# Patient Record
Sex: FEMALE | Race: WHITE | NOT HISPANIC OR LATINO | Employment: OTHER | URBAN - METROPOLITAN AREA
[De-identification: names, ages, dates, MRNs, and addresses within clinical notes are randomized per-mention and may not be internally consistent; named-entity substitution may affect disease eponyms.]

---

## 2018-01-02 ENCOUNTER — OFFICE VISIT (OUTPATIENT)
Dept: FAMILY MEDICINE CLINIC | Facility: CLINIC | Age: 83
End: 2018-01-02

## 2018-01-02 VITALS
HEIGHT: 67 IN | BODY MASS INDEX: 30.7 KG/M2 | SYSTOLIC BLOOD PRESSURE: 98 MMHG | OXYGEN SATURATION: 99 % | DIASTOLIC BLOOD PRESSURE: 54 MMHG | TEMPERATURE: 98 F | HEART RATE: 83 BPM | WEIGHT: 195.6 LBS

## 2018-01-02 DIAGNOSIS — G31.84 MCI (MILD COGNITIVE IMPAIRMENT): ICD-10-CM

## 2018-01-02 DIAGNOSIS — Z00.00 MEDICARE ANNUAL WELLNESS VISIT, SUBSEQUENT: Primary | ICD-10-CM

## 2018-01-02 PROCEDURE — G0439 PPPS, SUBSEQ VISIT: HCPCS | Performed by: FAMILY MEDICINE

## 2018-01-02 PROCEDURE — 90732 PPSV23 VACC 2 YRS+ SUBQ/IM: CPT | Performed by: FAMILY MEDICINE

## 2018-01-02 PROCEDURE — G0009 ADMIN PNEUMOCOCCAL VACCINE: HCPCS | Performed by: FAMILY MEDICINE

## 2018-01-02 PROCEDURE — 99213 OFFICE O/P EST LOW 20 MIN: CPT | Performed by: FAMILY MEDICINE

## 2018-01-02 NOTE — PROGRESS NOTES
"Subjective   Nenita Tavares is a 86 y.o. female.     Chief Complaint   Patient presents with   • Annual Exam     AWV       Memory issues  History of Present Illness    Patient presents today for memory issues.   is concerned.  She occasionally forget things.  Patient does drive.  Does not get lost driving.  No depression symptoms.  She's not exercising as much she used to.  Especially because the cold weather.  She is considering going to the [employer].  Followed by GI for irritable bowel syndrome.  She takes to nortriptyline tablets at nighttime.  No dry mouth.  Also continues omeprazole 20 mg day.  Symptoms have been going on for some time.      The following portions of the patient's history were reviewed and updated as appropriate: allergies, current medications, past family history, past medical history, past social history, past surgical history and problem list.          Review of Systems   Constitutional: Negative.    Respiratory: Negative.    Cardiovascular: Negative.    Musculoskeletal: Negative.    Neurological: Negative.    Psychiatric/Behavioral: Positive for confusion. Negative for dysphoric mood.       Objective   Blood pressure 98/54, pulse 83, temperature 98 °F (36.7 °C), temperature source Oral, height 170.2 cm (67.01\"), weight 88.7 kg (195 lb 9.6 oz), SpO2 99 %.  Physical Exam   Constitutional: She appears well-developed and well-nourished. No distress.   Neck: No thyromegaly present.   Cardiovascular: Normal rate, regular rhythm, normal heart sounds and intact distal pulses.    Pulmonary/Chest: Effort normal and breath sounds normal.   Musculoskeletal: She exhibits no edema.   Skin: Skin is warm and dry.   Psychiatric: She has a normal mood and affect. Her behavior is normal. Judgment and thought content normal.   Nursing note and vitals reviewed.    Mini-Mental Status exam 2 errors.  Missed \"[address]\".  Clock drawing test equivocal.  and crescent moon shape.  Hands " reversed.    Assessment/Plan   Nenita was seen today for annual exam.    Diagnoses and all orders for this visit:    Medicare annual wellness visit, subsequent    MCI (mild cognitive impairment)    Other orders  -     Pneumococcal Polysaccharide Vaccine 23-Valent Greater Than or Equal To 1yo Subcutaneous / IM      Probable mild cognitive impairment.  At this time no further workup needed.  However I do want to observe.  I'll see her back within 6 months recheck mental status examination.  Family and patient to call with changes.

## 2018-01-02 NOTE — PROGRESS NOTES
QUICK REFERENCE INFORMATION:  The ABCs of the Annual Wellness Visit    Subsequent Medicare Wellness Visit    HEALTH RISK ASSESSMENT    5/3/1931    Recent Hospitalizations:  No hospitalization(s) within the last year..        Current Medical Providers:  Patient Care Team:  Srini Gustafson MD as PCP - General  Srini Gustafson MD as PCP - Family Medicine        Smoking Status:  History   Smoking Status   • Never Smoker   Smokeless Tobacco   • Never Used       Alcohol Consumption:  History   Alcohol Use   • Yes     Comment: rare       Depression Screen:   PHQ-2/PHQ-9 Depression Screening 1/2/2018   Little interest or pleasure in doing things 0   Feeling down, depressed, or hopeless 0   Total Score 0       Health Habits and Functional and Cognitive Screening:  Functional & Cognitive Status 1/2/2018   Do you have difficulty preparing food and eating? No   Do you have difficulty bathing yourself, getting dressed or grooming yourself? No   Do you have difficulty using the toilet? No   Do you have difficulty moving around from place to place? No   Do you have trouble with steps or getting out of a bed or a chair? Yes   In the past year have you fallen or experienced a near fall? No   Current Diet Well Balanced Diet   Dental Exam Up to date   Eye Exam Up to date   Exercise (times per week) 0 times per week   Current Exercise Activities Include None   Do you need help using the phone?  No   Are you deaf or do you have serious difficulty hearing?  No   Do you need help with transportation? No   Do you need help shopping? No   Do you need help preparing meals?  No   Do you need help with housework?  No   Do you need help with laundry? No   Do you need help taking your medications? No   Do you need help managing money? No   Have you felt unusual stress, anger or loneliness in the last month? No   Who do you live with? Spouse   If you need help, do you have trouble finding someone available to you? No   Have you been bothered in  the last four weeks by sexual problems? No   Do you have difficulty concentrating, remembering or making decisions? Yes           Does the patient have evidence of cognitive impairment? Possibly.... See note.    Aspirin use counseling: Does not need ASA (and currently is not on it)      Recent Lab Results:  CMP:  Lab Results   Component Value Date    BUN 15 06/20/2016    CREATININE 0.80 06/20/2016    EGFRIFNONA 68 06/20/2016    BCR 18.8 06/20/2016     06/20/2016    K 4.0 06/20/2016    CO2 23.2 06/20/2016    CALCIUM 9.4 06/20/2016    ALBUMIN 3.90 06/20/2016    LABIL2 1.7 06/20/2016    BILITOT 0.4 06/20/2016    ALKPHOS 63 06/20/2016    AST 19 06/20/2016    ALT 15 06/20/2016     Lipid Panel:     HbA1c:       Visual Acuity:  No exam data present    Age-appropriate Screening Schedule:  Refer to the list below for future screening recommendations based on patient's age, sex and/or medical conditions. Orders for these recommended tests are listed in the plan section. The patient has been provided with a written plan.    Health Maintenance   Topic Date Due   • TDAP/TD VACCINES (1 - Tdap) 05/03/1950   • PNEUMOCOCCAL VACCINES (65+ LOW/MEDIUM RISK) (1 of 2 - PCV13) 05/03/1996   • MAMMOGRAM  06/20/2016   • ZOSTER VACCINE  06/20/2016   • LIPID PANEL  01/02/2018   • INFLUENZA VACCINE  Addressed        Immunization History   Administered Date(s) Administered   • Pneumococcal Conjugate 13-Valent 08/07/2015   • Pneumococcal Polysaccharide 01/02/2018         Subjective   History of Present Illness    Nenita Tavares is a 86 y.o. female who presents for an Subsequent Wellness Visit.    The following portions of the patient's history were reviewed and updated as appropriate: allergies, current medications, past family history, past medical history, past social history, past surgical history and problem list.    Outpatient Medications Prior to Visit   Medication Sig Dispense Refill   • Lactase (LACTAID PO) Take  by mouth as needed.   "   • Loperamide HCl (IMODIUM PO) Take  by mouth as needed.     • nortriptyline (PAMELOR) 10 MG capsule Take  by mouth.     • omeprazole (PRILOSEC) 20 MG capsule Take  by mouth.     • Probiotic Product (PROBIOTIC DAILY PO) Take  by mouth.     • Calcium Carbonate-Vitamin D (CALCIUM-D PO) Take  by mouth.       No facility-administered medications prior to visit.        Patient Active Problem List   Diagnosis   • HLD (hyperlipidemia)   • Irritable bowel syndrome with diarrhea   • Neuralgia neuritis, sciatic nerve   • MCI (mild cognitive impairment)       Advance Care Planning:  has an advance directive - a copy has been provided and is in file    Identification of Risk Factors:  Risk factors include: cognitive impairment.    Review of Systems    Compared to one year ago, the patient feels her physical health is better.  Compared to one year ago, the patient feels her mental health is the same.    Objective     Physical Exam    Vitals:    01/02/18 0916   BP: 98/54   Pulse: 83   Temp: 98 °F (36.7 °C)   TempSrc: Oral   SpO2: 99%   Weight: 88.7 kg (195 lb 9.6 oz)   Height: 170.2 cm (67.01\")       Body mass index is 30.63 kg/(m^2).  Discussed the patient's BMI with her. BMI is above normal parameters. Follow-up plan includes:  exercise counseling.    Assessment/Plan   Patient Self-Management and Personalized Health Advice  The patient has been provided with information about: exercise and mental health concerns and preventive services including:   · Pneumococcal vaccine .    Visit Diagnoses:    ICD-10-CM ICD-9-CM   1. Medicare annual wellness visit, subsequent Z00.00 V70.0   2. MCI (mild cognitive impairment) G31.84 331.83       Orders Placed This Encounter   Procedures   • Pneumococcal Polysaccharide Vaccine 23-Valent Greater Than or Equal To 1yo Subcutaneous / IM       Outpatient Encounter Prescriptions as of 1/2/2018   Medication Sig Dispense Refill   • Lactase (LACTAID PO) Take  by mouth as needed.     • Loperamide HCl " (IMODIUM PO) Take  by mouth as needed.     • nortriptyline (PAMELOR) 10 MG capsule Take  by mouth.     • omeprazole (PRILOSEC) 20 MG capsule Take  by mouth.     • Probiotic Product (PROBIOTIC DAILY PO) Take  by mouth.     • [DISCONTINUED] Calcium Carbonate-Vitamin D (CALCIUM-D PO) Take  by mouth.       No facility-administered encounter medications on file as of 1/2/2018.        Reviewed use of high risk medication in the elderly: yes  Reviewed for potential of harmful drug interactions in the elderly: yes    Follow Up:  Return in about 6 months (around 7/2/2018).     An After Visit Summary and PPPS with all of these plans were given to the patient.

## 2018-07-03 ENCOUNTER — OFFICE VISIT (OUTPATIENT)
Dept: FAMILY MEDICINE CLINIC | Facility: CLINIC | Age: 83
End: 2018-07-03

## 2018-07-03 VITALS
OXYGEN SATURATION: 98 % | HEIGHT: 67 IN | WEIGHT: 189 LBS | BODY MASS INDEX: 29.66 KG/M2 | HEART RATE: 79 BPM | SYSTOLIC BLOOD PRESSURE: 118 MMHG | TEMPERATURE: 98.5 F | DIASTOLIC BLOOD PRESSURE: 68 MMHG

## 2018-07-03 DIAGNOSIS — G31.84 MCI (MILD COGNITIVE IMPAIRMENT): Primary | ICD-10-CM

## 2018-07-03 PROCEDURE — 99213 OFFICE O/P EST LOW 20 MIN: CPT | Performed by: FAMILY MEDICINE

## 2018-07-03 RX ORDER — MONTELUKAST SODIUM 4 MG/1
1 TABLET, CHEWABLE ORAL 2 TIMES DAILY
COMMUNITY
Start: 2018-06-05 | End: 2020-12-09

## 2018-07-03 NOTE — PROGRESS NOTES
"Subjective   Nenita Tavares is a 87 y.o. female.     Chief Complaint   Patient presents with   • Hyperlipidemia        History of Present Illness    Follow-up mild cognitive impairment.  About 6 months ago was seen.  2 errors on Mini-Mental Status exam.  Equivocal clock drawing test with hands reversed and crescent-shaped numbers.    In the last 6 months neither patient nor her  feel as if there been any steep changes.  Overall stable functioning.  She is driving now.  Not getting lost.  She enjoys doing her puzzles and crosswords.  She goes to the [employer] every so often with a pledgeted amount of money.  There is no behavioral concerns.  No depression.  She is going to an exercise program a few times a week which she enjoys.  Her father did suffer from dementia.  Her IBS symptoms are much improved with Colestid.        The following portions of the patient's history were reviewed and updated as appropriate: allergies, current medications, past family history, past medical history, past social history, past surgical history and problem list.          Review of Systems   Constitutional: Negative.    Respiratory: Negative.    Cardiovascular: Negative.    Musculoskeletal: Negative.    Neurological: Negative.    Psychiatric/Behavioral: Negative.  Negative for confusion and dysphoric mood. The patient is not nervous/anxious.        Objective   Blood pressure 118/68, pulse 79, temperature 98.5 °F (36.9 °C), temperature source Oral, height 170.2 cm (67.01\"), weight 85.7 kg (189 lb), SpO2 98 %.  Physical Exam   Constitutional: She appears well-developed and well-nourished. No distress.   Neck: No thyromegaly present.   Cardiovascular: Normal rate, regular rhythm, normal heart sounds and intact distal pulses.    Pulmonary/Chest: Effort normal and breath sounds normal.   Musculoskeletal: She exhibits no edema.   Skin: Skin is warm and dry.   Psychiatric: She has a normal mood and affect. Her behavior is normal. Judgment " and thought content normal.   Nursing note and vitals reviewed.    Blessed Mini-Mental status examination, 0 errors.  The clock drawing test is improved compared to last time.  The numbers are perfectly normal and spaced.  However she did do the hands wrong.  She did 10 min to 11 versus 10 minutes after 11.    .  Assessment/Plan   Nenita was seen today for hyperlipidemia.    Diagnoses and all orders for this visit:    MCI (mild cognitive impairment)       mild cognitive impairment versus age-related cognitive decline.  There is no current evidence of dementia.  No absolute indication for MRI imaging.  I recommend observation.  Continue exercising.  If there are major changes in her cognitive functioning, or other concerns develop such as getting lost driving, recommend further evaluation.  Otherwise I'll see her in 6 months for recheck and annual wellness visit

## 2018-11-12 ENCOUNTER — OFFICE VISIT (OUTPATIENT)
Dept: FAMILY MEDICINE CLINIC | Facility: CLINIC | Age: 83
End: 2018-11-12

## 2018-11-12 VITALS
DIASTOLIC BLOOD PRESSURE: 75 MMHG | HEIGHT: 67 IN | HEART RATE: 78 BPM | OXYGEN SATURATION: 100 % | BODY MASS INDEX: 29.73 KG/M2 | WEIGHT: 189.4 LBS | SYSTOLIC BLOOD PRESSURE: 144 MMHG | TEMPERATURE: 97 F

## 2018-11-12 DIAGNOSIS — S43.421A SPRAIN OF RIGHT ROTATOR CUFF CAPSULE, INITIAL ENCOUNTER: Primary | ICD-10-CM

## 2018-11-12 PROCEDURE — 99213 OFFICE O/P EST LOW 20 MIN: CPT | Performed by: FAMILY MEDICINE

## 2018-11-12 NOTE — PROGRESS NOTES
"Subjective   Nenita Tavares is a 87 y.o. female.     Chief Complaint   Patient presents with   • Arm Pain     right arm upper arm pain,going into the shoulder, x sept, she goes to a fitness center and lifts some weights        History of Present Illness    Right shoulder pain for a couple of months.  She thinks she might have injured herself lifting weights at the fitness center.  Mild to moderate pain getting worse.  Bothers her at night.  No weakness.  She has ongoing right ulnar nerve numbness from a ulnar nerve release about 5 years ago.  But not worse.  No injury otherwise.  Worse with certain movements, especially internal rotation.      The following portions of the patient's history were reviewed and updated as appropriate: allergies, current medications, past family history, past medical history, past social history, past surgical history and problem list.          Review of Systems   Constitutional: Negative.    Musculoskeletal: Negative for joint swelling.   Neurological: Negative for weakness.       Objective   Blood pressure 144/75, pulse 78, temperature 97 °F (36.1 °C), temperature source Oral, height 170.2 cm (67.01\"), weight 85.9 kg (189 lb 6.4 oz), SpO2 100 %.  Physical Exam   Constitutional: No distress.   HENT:   Head: Atraumatic.   Cardiovascular: Normal rate.   Pulmonary/Chest: Effort normal.   Musculoskeletal:   Right shoulder full range of motion exception of internal rotation.  Strength 5 out of 5.  She has pain to palpation over the deltoid.  Positive impingement testing.  No acromioclavicular discomfort.   Skin: She is not diaphoretic.       Assessment/Plan   Nenita was seen today for arm pain.    Diagnoses and all orders for this visit:    Sprain of right rotator cuff capsule, initial encounter  -     Ambulatory Referral to Sports Medicine  -     Ambulatory Referral to Physical Therapy Evaluate and treat        Rotator cuff tendinitis or tendinopathy.  I'm referring to physical therapy.  She " has mild to moderate symptoms.  She also may be a good candidate for steroid injection, referring to sports medicine.  I'll follow-up with her as needed.

## 2018-11-15 ENCOUNTER — OFFICE VISIT (OUTPATIENT)
Dept: SPORTS MEDICINE | Facility: CLINIC | Age: 83
End: 2018-11-15

## 2018-11-15 VITALS
OXYGEN SATURATION: 99 % | WEIGHT: 189 LBS | SYSTOLIC BLOOD PRESSURE: 122 MMHG | HEIGHT: 67 IN | DIASTOLIC BLOOD PRESSURE: 68 MMHG | BODY MASS INDEX: 29.66 KG/M2 | HEART RATE: 90 BPM

## 2018-11-15 DIAGNOSIS — M25.511 CHRONIC RIGHT SHOULDER PAIN: Primary | ICD-10-CM

## 2018-11-15 DIAGNOSIS — G89.29 CHRONIC RIGHT SHOULDER PAIN: Primary | ICD-10-CM

## 2018-11-15 DIAGNOSIS — M75.41 SUBACROMIAL IMPINGEMENT OF RIGHT SHOULDER: ICD-10-CM

## 2018-11-15 PROCEDURE — 73030 X-RAY EXAM OF SHOULDER: CPT | Performed by: FAMILY MEDICINE

## 2018-11-15 PROCEDURE — 99214 OFFICE O/P EST MOD 30 MIN: CPT | Performed by: FAMILY MEDICINE

## 2018-11-15 PROCEDURE — 20610 DRAIN/INJ JOINT/BURSA W/O US: CPT | Performed by: FAMILY MEDICINE

## 2018-11-15 RX ORDER — TRIAMCINOLONE ACETONIDE 40 MG/ML
80 INJECTION, SUSPENSION INTRA-ARTICULAR; INTRAMUSCULAR ONCE
Status: COMPLETED | OUTPATIENT
Start: 2018-11-15 | End: 2018-11-15

## 2018-11-15 RX ADMIN — TRIAMCINOLONE ACETONIDE 80 MG: 40 INJECTION, SUSPENSION INTRA-ARTICULAR; INTRAMUSCULAR at 11:24

## 2018-11-15 NOTE — PROGRESS NOTES
"Nenita is a 87 y.o. year old female    Chief Complaint   Patient presents with   • Right Shoulder - Consult, Pain     Lifting small weights for about a years, just started hurting in September. Referred Dr. Gustafson.        History of Present Illness  HPI   Right shoulder pain that began several months ago with no known injury.  She does do senior weight class and exercises regularly.  She has been doing this for the past  year.  Has had pain with overhead motion and also nighttime awakenings.  Has tried Aleve.  Here for further evaluation.  Requests injection.    I have reviewed the patient's medical, family, and social history in detail and updated the computerized patient record.    Review of Systems   Constitutional: Negative for fever.   Musculoskeletal:        Per HPI   Skin: Negative for rash.   Neurological: Negative for weakness and numbness.   Psychiatric/Behavioral: Negative for sleep disturbance.   All other systems reviewed and are negative.      /68   Pulse 90   Ht 170.2 cm (67\")   Wt 85.7 kg (189 lb)   SpO2 99%   BMI 29.60 kg/m²      Physical Exam    Vital signs reviewed.   General: No acute distress.  Eyes: conjunctiva clear; pupils equally round and reactive  ENT: external ears and nose atraumatic; oropharynx clear  CV: no peripheral edema, 2+ distal pulses  Resp: normal respiratory effort, no use of accessory muscles  Skin: no rashes or wounds; normal turgor  Psych: mood and affect appropriate; recent and remote memory intact  Neuro: sensation to light touch intact    MSK Exam:  Right Shoulder Exam     Tenderness   Right shoulder tenderness location: subacromial space.    Range of Motion   The patient has normal right shoulder ROM.  Right shoulder active abduction: painful.   Right shoulder internal rotation 0 degrees: painful.     Muscle Strength   The patient has normal right shoulder strength.    Tests   Huff test: positive  Impingement: positive  Drop arm: negative    Other   Erythema: " "absent  Sensation: normal          Right Shoulder X-Ray  Indication: Pain  Views: Axillary Internal and External Rotation    Findings:  No fracture  No bony lesion  Normal soft tissues  Normal joint spaces    No prior studies were available for comparison.    Shoulder Injection Procedure Note    Right shoulder subacromial bursa injection was discussed with the patient in detail, including indication, risks, benefits, and alternatives. Verbal consent was given for the procedure. Injection was performed by physician.  Injection site was identified by physical examination and cleaned with Betadine and alcohol swabs. Prior to needle insertion, ethyl chloride spray was used for surface anesthesia. Sterile technique was used.  A 22-gauge, 1.5\" needle was directed to the joint from a(n) posterior approach. Injectate was passed into the subacromial bursa without difficulty. The needle was removed and a simple bandage was applied. The procedure was tolerated well without difficulty.    Injection mixture:  1% lidocaine without epinephrine: 4 mL  40 mg/mL triamcinolone acetonide: 2 mL    Diagnoses and all orders for this visit:    Chronic right shoulder pain  -     XR Shoulder 2+ View Right  -     triamcinolone acetonide (KENALOG-40) injection 80 mg; Inject 2 mL into the appropriate joint as directed by provider 1 (One) Time.    Subacromial impingement of right shoulder  -     triamcinolone acetonide (KENALOG-40) injection 80 mg; Inject 2 mL into the appropriate joint as directed by provider 1 (One) Time.      Postprocedural instructions given.  Home exercise program given.    EMR Dragon/Transcription disclaimer:    Much of this encounter note is an electronic transcription/translation of spoken language to printed text.  The electronic translation of spoken language may permit erroneous, or at times, nonsensical words or phrases to be inadvertently transcribed.  Although I have reviewed the note for such errors some may still " exist.

## 2019-01-18 ENCOUNTER — OFFICE VISIT (OUTPATIENT)
Dept: FAMILY MEDICINE CLINIC | Facility: CLINIC | Age: 84
End: 2019-01-18

## 2019-01-18 VITALS
HEART RATE: 90 BPM | WEIGHT: 185.3 LBS | SYSTOLIC BLOOD PRESSURE: 112 MMHG | OXYGEN SATURATION: 99 % | HEIGHT: 67 IN | DIASTOLIC BLOOD PRESSURE: 74 MMHG | BODY MASS INDEX: 29.08 KG/M2 | TEMPERATURE: 97.9 F

## 2019-01-18 DIAGNOSIS — Z00.00 MEDICARE ANNUAL WELLNESS VISIT, SUBSEQUENT: Primary | ICD-10-CM

## 2019-01-18 DIAGNOSIS — K58.0 IRRITABLE BOWEL SYNDROME WITH DIARRHEA: ICD-10-CM

## 2019-01-18 DIAGNOSIS — G31.84 MCI (MILD COGNITIVE IMPAIRMENT): ICD-10-CM

## 2019-01-18 DIAGNOSIS — I95.2 HYPOTENSION DUE TO DRUGS: ICD-10-CM

## 2019-01-18 PROCEDURE — G0439 PPPS, SUBSEQ VISIT: HCPCS | Performed by: FAMILY MEDICINE

## 2019-01-18 PROCEDURE — 93000 ELECTROCARDIOGRAM COMPLETE: CPT | Performed by: FAMILY MEDICINE

## 2019-01-18 PROCEDURE — 99214 OFFICE O/P EST MOD 30 MIN: CPT | Performed by: FAMILY MEDICINE

## 2019-01-18 NOTE — PROGRESS NOTES
QUICK REFERENCE INFORMATION:  The ABCs of the Annual Wellness Visit    Subsequent Medicare Wellness Visit    HEALTH RISK ASSESSMENT    5/3/1931    Recent Hospitalizations:  No hospitalization(s) within the last year..        Current Medical Providers:  Patient Care Team:  Srini Gustafson MD as PCP - General  Srini Gustafson MD as PCP - Family Medicine        Smoking Status:  Social History     Tobacco Use   Smoking Status Never Smoker   Smokeless Tobacco Never Used       Alcohol Consumption:  Social History     Substance and Sexual Activity   Alcohol Use Yes    Comment: social drink       Depression Screen:   PHQ-2/PHQ-9 Depression Screening 1/2/2018   Little interest or pleasure in doing things 0   Feeling down, depressed, or hopeless 0   Total Score 0       Health Habits and Functional and Cognitive Screening:  Functional & Cognitive Status 1/18/2019   Do you have difficulty preparing food and eating? No   Do you have difficulty bathing yourself, getting dressed or grooming yourself? No   Do you have difficulty using the toilet? No   Do you have difficulty moving around from place to place? No   Do you have trouble with steps or getting out of a bed or a chair? No   In the past year have you fallen or experienced a near fall? No   Current Diet Unhealthy Diet   Dental Exam Up to date   Eye Exam Up to date   Exercise (times per week) 2 times per week   Current Exercise Activities Include Cardiovasular Workout on Exercise Equipment   Do you need help using the phone?  No   Are you deaf or do you have serious difficulty hearing?  No   Do you need help with transportation? No   Do you need help shopping? No   Do you need help preparing meals?  No   Do you need help with housework?  No   Do you need help with laundry? No   Do you need help taking your medications? No   Do you need help managing money? No   Do you ever drive or ride in a car without wearing a seat belt? No   Have you felt unusual stress, anger or  loneliness in the last month? No   Who do you live with? Spouse   If you need help, do you have trouble finding someone available to you? No   Have you been bothered in the last four weeks by sexual problems? No   Do you have difficulty concentrating, remembering or making decisions? No           Does the patient have evidence of cognitive impairment? Yes    Aspirin use counseling: Does not need ASA (and currently is not on it)      Recent Lab Results:  CMP:  Lab Results   Component Value Date    BUN 15 06/20/2016    CREATININE 0.80 06/20/2016    EGFRIFNONA 68 06/20/2016    BCR 18.8 06/20/2016     06/20/2016    K 4.0 06/20/2016    CO2 23.2 06/20/2016    CALCIUM 9.4 06/20/2016    ALBUMIN 3.90 06/20/2016    BILITOT 0.4 06/20/2016    ALKPHOS 63 06/20/2016    AST 19 06/20/2016    ALT 15 06/20/2016     Lipid Panel:     HbA1c:       Visual Acuity:  No exam data present    Age-appropriate Screening Schedule:  Refer to the list below for future screening recommendations based on patient's age, sex and/or medical conditions. Orders for these recommended tests are listed in the plan section. The patient has been provided with a written plan.    Health Maintenance   Topic Date Due   • TDAP/TD VACCINES (1 - Tdap) 05/03/1950   • ZOSTER VACCINE (1 of 2) 05/03/1981   • MAMMOGRAM  06/20/2016   • LIPID PANEL  01/02/2018   • INFLUENZA VACCINE  08/01/2018   • PNEUMOCOCCAL VACCINES (65+ LOW/MEDIUM RISK)  Completed        Subjective   History of Present Illness    Nenita Tavares is a 87 y.o. female who presents for an Subsequent Wellness Visit.    The following portions of the patient's history were reviewed and updated as appropriate: allergies, current medications, past family history, past medical history, past social history, past surgical history and problem list.    Outpatient Medications Prior to Visit   Medication Sig Dispense Refill   • colestipol (COLESTID) 1 g tablet Take 1 tablet by mouth 2 (Two) Times a Day.     •  "Lactase (LACTAID PO) Take  by mouth as needed.     • Loperamide HCl (IMODIUM PO) Take  by mouth as needed.     • nortriptyline (PAMELOR) 10 MG capsule Take  by mouth.     • omeprazole (PRILOSEC) 20 MG capsule Take  by mouth.     • Probiotic Product (PROBIOTIC DAILY PO) Take  by mouth.       No facility-administered medications prior to visit.        Patient Active Problem List   Diagnosis   • HLD (hyperlipidemia)   • Irritable bowel syndrome with diarrhea   • MCI (mild cognitive impairment)       Advance Care Planning:  Has living will on file.     Identification of Risk Factors:  Risk factors include: cardiovascular risk and cognitive impairment.    Review of Systems    Compared to one year ago, the patient feels her physical health is the same.  Compared to one year ago, the patient feels her mental health is the same.    Objective     Physical Exam    Vitals:    01/18/19 1304 01/18/19 1312   BP: 141/85 112/74   Pulse: 90    Temp: 97.9 °F (36.6 °C)    TempSrc: Oral    SpO2: 99%    Weight: 84.1 kg (185 lb 4.8 oz)    Height: 170.2 cm (67.01\")        Patient's Body mass index is 29.02 kg/m². BMI is within normal parameters. No follow-up required..      Assessment/Plan   Patient Self-Management and Personalized Health Advice  The patient has been provided with information about: mental health concerns and preventive services including:   · recommend new shingles vaccine at retail pharmacy.    Visit Diagnoses:    ICD-10-CM ICD-9-CM   1. Medicare annual wellness visit, subsequent Z00.00 V70.0   2. MCI (mild cognitive impairment) G31.84 331.83   3. Irritable bowel syndrome with diarrhea K58.0 564.1       No orders of the defined types were placed in this encounter.      Outpatient Encounter Medications as of 1/18/2019   Medication Sig Dispense Refill   • colestipol (COLESTID) 1 g tablet Take 1 tablet by mouth 2 (Two) Times a Day.     • Lactase (LACTAID PO) Take  by mouth as needed.     • Loperamide HCl (IMODIUM PO) Take  " by mouth as needed.     • nortriptyline (PAMELOR) 10 MG capsule Take  by mouth.     • omeprazole (PRILOSEC) 20 MG capsule Take  by mouth.     • Probiotic Product (PROBIOTIC DAILY PO) Take  by mouth.       No facility-administered encounter medications on file as of 1/18/2019.        Reviewed use of high risk medication in the elderly: yes  Reviewed for potential of harmful drug interactions in the elderly: yes    Follow Up:  No Follow-up on file.     An After Visit Summary and PPPS with all of these plans were given to the patient.

## 2019-01-18 NOTE — PROGRESS NOTES
Subjective   Nenita Tavares is a 87 y.o. female.     Chief Complaint   Patient presents with   • Medicare Wellness-subsequent   • Dizziness     x 1 wk         History of Present Illness     Dizziness on and off for 3 days.  Patient describes as vertigo.  But no spinning.  He she feels unsteady.  It's worse when she stands up.  She is on on blood pressure medication.  She does not check her blood pressure at home.  She states she has little twinges of left-sided breast and chest discomfort going into her left arm.  Occurs very rarely.  Sometimes during the day.  Times at nighttime.  Not with activity.  She describes the dizziness getting worse and progressive.  She has no changes in neurological function such as new weakness, numbness, slurred speech, or other changes.  She's had a slight headache in the back of her head.  She describes no significant stressors.  No depression.  She states she's had vertigo symptoms in the distant past.  No recent cold symptoms.  No fever.  No sinus congestion or ear pain.  She describes the dizziness as being severe at times.    Memory issues.  Possible mild cognitive impairment.  No change in memory.  Mood.  Or executive function.    IBS.  Table.  On nortriptyline prescribed by GI.        The following portions of the patient's history were reviewed and updated as appropriate: allergies, current medications, past family history, past medical history, past social history, past surgical history and problem list.          Review of Systems   Constitutional: Negative.  Negative for fatigue and fever.   Respiratory: Negative.    Cardiovascular: Positive for chest pain. Negative for palpitations and leg swelling.   Gastrointestinal: Negative.    Genitourinary: Negative.    Musculoskeletal: Negative.    Neurological: Positive for dizziness and headaches. Negative for tremors, seizures, syncope, speech difficulty, weakness and light-headedness.   Psychiatric/Behavioral: Negative.   "      Objective   Blood pressure 112/74, pulse 90, temperature 97.9 °F (36.6 °C), temperature source Oral, height 170.2 cm (67.01\"), weight 84.1 kg (185 lb 4.8 oz), SpO2 99 %.  Physical Exam   Constitutional: No distress.   No acute distress.  Nontoxic.   HENT:   Right Ear: Tympanic membrane, external ear and ear canal normal.   Left Ear: Tympanic membrane, external ear and ear canal normal.   Nose: Nose normal.   Mouth/Throat: Oropharynx is clear and moist. No oropharyngeal exudate.   Eyes: Conjunctivae are normal. Right eye exhibits no discharge. Left eye exhibits no discharge. No scleral icterus.   Cardiovascular: Normal rate.   Pulmonary/Chest: Effort normal and breath sounds normal. No stridor. No respiratory distress. She has no wheezes. She has no rales.   No tachypnea   Lymphadenopathy:     She has no cervical adenopathy.   Neurological:   Neurological exam.  Pupils equal and reactive to light.  Extra ocular muscle movements intact all directions.  Face symmetric.  Negative [place]-Hallpike.  No nystagmus.  Gait is unremarkable.  No ataxia.  No dysmetria.   Skin: Skin is warm and dry. No rash noted.   Psychiatric: She has a normal mood and affect. Her behavior is normal.   Nursing note and vitals reviewed.    Orthostatic blood pressure readings.  Lying down 147/77, sitting 143/78, standing 118/68.  Repeat 121/68.  No change in heart rate    EKG.  Indication lightheadedness.  Sinus rhythm.  Normal ventricular rate.  Normal KS interval.  Normal QRS.  Normal QTC.  Normal axis.  There are no ST segment abnormalities.  There are some U waves noted.  Overall normal EKG.    On the exam table she'll hold her head as if she is dizzy.  But when distracted and talking, she feels fine.  At one point she was able to bend over and show me a bruise on her leg without any symptoms at all.    Assessment/Plan   Nenita was seen today for medicare wellness-subsequent and dizziness.    Diagnoses and all orders for this " visit:    Medicare annual wellness visit, subsequent    MCI (mild cognitive impairment)    Irritable bowel syndrome with diarrhea    Hypotension due to drugs      Dizziness.  Probable mild orthostatic hypotension.  The main culprit is the nortriptyline.  I recommended she stop it.  She states she does not want to because she states she gets bad diarrhea without it.  She states she has had a lot of dry mouth lately.  I recommend plenty of fluids.  She also take Pedialyte.  At this point I don't see a central cause of her dizziness.  Likely not central vertigo.  Likely not a impending CVA or other significant event.  Is also no evidence of peripheral vertigo.  That said the patient and  understand to go directly to the emergency room with worsening symptoms this weekend.  I want to see her back 1 week for recheck.    Memory changes.  Possible mild cognitive impairment.  Unchanged.    IBS with diarrhea.  See above.

## 2019-01-25 ENCOUNTER — OFFICE VISIT (OUTPATIENT)
Dept: FAMILY MEDICINE CLINIC | Facility: CLINIC | Age: 84
End: 2019-01-25

## 2019-01-25 VITALS
OXYGEN SATURATION: 98 % | SYSTOLIC BLOOD PRESSURE: 153 MMHG | DIASTOLIC BLOOD PRESSURE: 80 MMHG | TEMPERATURE: 97.6 F | HEART RATE: 90 BPM | BODY MASS INDEX: 29.03 KG/M2 | WEIGHT: 185 LBS | HEIGHT: 67 IN

## 2019-01-25 DIAGNOSIS — R42 DIZZINESS: Primary | ICD-10-CM

## 2019-01-25 PROCEDURE — 99213 OFFICE O/P EST LOW 20 MIN: CPT | Performed by: FAMILY MEDICINE

## 2019-01-25 NOTE — PROGRESS NOTES
"Subjective   Nenita Tavares is a 87 y.o. female.     Chief Complaint   Patient presents with   • Dizziness     pt said she is feeling better        History of Present Illness    Follow-up dizziness and probable orthostatic hypotension.  I was concerned about other etiologies.  She continues the nortriptyline because of her diarrhea predominant IBS.  The diarrhea comes and goes.  She's back into a little bit but she's well-hydrated.  Drinking plenty of water.  She no longer has the dizziness or lightheadedness.  There are no cardiovascular or neurovascular symptoms.      The following portions of the patient's history were reviewed and updated as appropriate: allergies, current medications, past family history, past medical history, past social history, past surgical history and problem list.          Review of Systems   Constitutional: Negative.    Respiratory: Negative.    Cardiovascular: Negative.    Musculoskeletal: Negative.    Neurological: Negative for dizziness, light-headedness and headaches.       Objective   Blood pressure 153/80, pulse 90, temperature 97.6 °F (36.4 °C), temperature source Oral, height 170.2 cm (67.01\"), weight 83.9 kg (185 lb), SpO2 98 %.  Physical Exam   Constitutional: She appears well-developed and well-nourished. No distress.   Eyes: Conjunctivae and EOM are normal. Pupils are equal, round, and reactive to light.   Neck: No thyromegaly present.   Cardiovascular: Normal rate, regular rhythm, normal heart sounds and intact distal pulses.   Pulmonary/Chest: Effort normal and breath sounds normal.   Musculoskeletal: She exhibits no edema.   Neurological: She is alert. She exhibits normal muscle tone. Coordination normal.   Skin: Skin is warm and dry.   Psychiatric: She has a normal mood and affect. Her behavior is normal. Judgment and thought content normal.   Nursing note and vitals reviewed.      Assessment/Plan   Nenita was seen today for dizziness.    Diagnoses and all orders for this " visit:    Dizziness     dizziness.  Resolved.  Probable resolved orthostatic hypotension.  Blood pressure is now higher.  She overall looks better.  She likely had some dehydration, probably from the IBS with diarrhea.  At this time no further workup needed.  I had asked her to come back today in case we need to order an MRI or other evaluation was in order.  With recurrent symptoms they will let us know.

## 2019-04-01 ENCOUNTER — OFFICE VISIT (OUTPATIENT)
Dept: FAMILY MEDICINE CLINIC | Facility: CLINIC | Age: 84
End: 2019-04-01

## 2019-04-01 ENCOUNTER — HOSPITAL ENCOUNTER (OUTPATIENT)
Dept: GENERAL RADIOLOGY | Facility: HOSPITAL | Age: 84
Discharge: HOME | End: 2019-04-01
Admitting: NURSE PRACTITIONER

## 2019-04-01 VITALS
HEART RATE: 86 BPM | BODY MASS INDEX: 29.9 KG/M2 | DIASTOLIC BLOOD PRESSURE: 68 MMHG | HEIGHT: 67 IN | SYSTOLIC BLOOD PRESSURE: 130 MMHG | OXYGEN SATURATION: 97 % | WEIGHT: 190.5 LBS | RESPIRATION RATE: 12 BRPM

## 2019-04-01 DIAGNOSIS — M54.2 NECK PAIN: ICD-10-CM

## 2019-04-01 DIAGNOSIS — M54.2 NECK PAIN: Primary | ICD-10-CM

## 2019-04-01 PROCEDURE — 72040 X-RAY EXAM NECK SPINE 2-3 VW: CPT

## 2019-04-01 PROCEDURE — 99213 OFFICE O/P EST LOW 20 MIN: CPT | Performed by: NURSE PRACTITIONER

## 2019-04-01 RX ORDER — BACLOFEN 10 MG/1
10 TABLET ORAL 3 TIMES DAILY PRN
Qty: 30 TABLET | Refills: 0 | Status: SHIPPED | OUTPATIENT
Start: 2019-04-01 | End: 2019-10-07

## 2019-04-01 NOTE — PROGRESS NOTES
Subjective   Nenita Tavares is a 87 y.o. female presents with neck pain, bilateral, worse with turning her head from side to side and posterior neck pain with pain into back of head with certain positions. States she was evaluated previously with Dr. Gustafson in January and her family was concerned it was a stroke. Upon reviewing notes further, patient denies same symptoms. Currently without dizziness or chest pain, no hypotensive episodes. States she is ok, no pain in the morning when waking, however the pain significantly worsens as the day goes on. She has tried tylenol with some relief. States aleve has not helped her symptoms.  She did develop numbness on her four fingertips that lasted seconds and then resolved. Denies any  changes, no problems picking up items. No facial drooping or speech changes.       Neck Pain    This is a new problem. The current episode started 1 to 4 weeks ago. The problem occurs daily. The problem has been unchanged. The pain is associated with an unknown factor. The pain is present in the midline. The quality of the pain is described as aching. The pain is at a severity of 6/10. The pain is moderate. The symptoms are aggravated by position and twisting. The pain is worse during the day. Associated symptoms include numbness (once in left fingers). Pertinent negatives include no chest pain, fever, headaches, leg pain, pain with swallowing, paresis, photophobia, syncope, tingling, trouble swallowing, visual change, weakness or weight loss. She has tried NSAIDs and acetaminophen for the symptoms. The treatment provided mild relief.        The following portions of the patient's history were reviewed and updated as appropriate: allergies, current medications, past family history, past medical history, past social history, past surgical history and problem list.    Review of Systems   Constitutional: Negative for fever and weight loss.   HENT: Negative for trouble swallowing.    Eyes:  Negative for photophobia.   Cardiovascular: Negative for chest pain and syncope.   Musculoskeletal: Positive for neck pain.   Neurological: Positive for numbness (once in left fingers). Negative for tingling, weakness and headaches.       Objective   Physical Exam   Constitutional: She is oriented to person, place, and time. She appears well-developed and well-nourished.   Cardiovascular: Normal rate, regular rhythm and normal heart sounds. Exam reveals no gallop and no friction rub.   No murmur heard.  Pulmonary/Chest: Effort normal and breath sounds normal. No stridor. No respiratory distress. She has no wheezes.   Musculoskeletal:        Cervical back: She exhibits decreased range of motion and tenderness. She exhibits no bony tenderness, no edema and no pain.   Neurological: She is alert and oriented to person, place, and time. She has normal strength. No cranial nerve deficit or sensory deficit. Gait normal.   Skin: Skin is warm and dry.   Psychiatric: She has a normal mood and affect.   Vitals reviewed.      Assessment/Plan   Nenita was seen today for pain.    Diagnoses and all orders for this visit:    Neck pain  -     XR Spine Cervical 2 or 3 View; Future  -     baclofen (LIORESAL) 10 MG tablet; Take 1 tablet by mouth 3 (Three) Times a Day As Needed for Muscle Spasms.    will check xr cervical spine  Start low dose baclofen, instructed pt to get up slowly, as it may increase her risk of falls and cause sedation  No driving or alcohol while taking this medication  Recommend 8 hour tylenol  Apply heating pad as needed  biofreeze for acute pain,   Will consider PT for continued symptoms, discussed potential need for MRI  For continued symptoms, recommend follow up with Dr. Gustafson  For acute changes in speech, vision, unilateral weakness, instructed to go to ER  Pt and daughter verbalized understanding.

## 2019-05-30 ENCOUNTER — TELEPHONE (OUTPATIENT)
Dept: FAMILY MEDICINE CLINIC | Facility: CLINIC | Age: 84
End: 2019-05-30

## 2019-05-30 NOTE — TELEPHONE ENCOUNTER
Pt had a fall and it hurting around her breast area. Due to all the provider full, I told the pt to go to the [facility] today or they will need to see one of the APRN tomorrow. Pt  is going to his wife what they want to do.

## 2019-09-07 ENCOUNTER — APPOINTMENT (OUTPATIENT)
Dept: GENERAL RADIOLOGY | Facility: HOSPITAL | Age: 84
End: 2019-09-07

## 2019-09-07 ENCOUNTER — HOSPITAL ENCOUNTER (EMERGENCY)
Facility: HOSPITAL | Age: 84
Discharge: HOME | End: 2019-09-08
Attending: EMERGENCY MEDICINE | Admitting: EMERGENCY MEDICINE

## 2019-09-07 DIAGNOSIS — S42.292A CLOSED FRACTURE OF HEAD OF LEFT HUMERUS, INITIAL ENCOUNTER: Primary | ICD-10-CM

## 2019-09-07 LAB
ALBUMIN SERPL-MCNC: 4 G/DL (ref 3.5–5.2)
ALBUMIN/GLOB SERPL: 1.5 G/DL
ALP SERPL-CCNC: 82 U/L (ref 39–117)
ALT SERPL W P-5'-P-CCNC: 12 U/L (ref 1–33)
ANION GAP SERPL CALCULATED.3IONS-SCNC: 13 MMOL/L (ref 5–15)
AST SERPL-CCNC: 17 U/L (ref 1–32)
BASOPHILS # BLD AUTO: 0.06 10*3/MM3 (ref 0–0.2)
BASOPHILS NFR BLD AUTO: 1 % (ref 0–1.5)
BILIRUB SERPL-MCNC: 0.2 MG/DL (ref 0.2–1.2)
BUN BLD-MCNC: 23 MG/DL (ref 8–23)
BUN/CREAT SERPL: 24.2 (ref 7–25)
CALCIUM SPEC-SCNC: 9.2 MG/DL (ref 8.6–10.5)
CHLORIDE SERPL-SCNC: 103 MMOL/L (ref 98–107)
CO2 SERPL-SCNC: 25 MMOL/L (ref 22–29)
CREAT BLD-MCNC: 0.95 MG/DL (ref 0.57–1)
DEPRECATED RDW RBC AUTO: 44.4 FL (ref 37–54)
EOSINOPHIL # BLD AUTO: 0.28 10*3/MM3 (ref 0–0.4)
EOSINOPHIL NFR BLD AUTO: 4.4 % (ref 0.3–6.2)
ERYTHROCYTE [DISTWIDTH] IN BLOOD BY AUTOMATED COUNT: 12.7 % (ref 12.3–15.4)
GFR SERPL CREATININE-BSD FRML MDRD: 56 ML/MIN/1.73
GLOBULIN UR ELPH-MCNC: 2.6 GM/DL
GLUCOSE BLD-MCNC: 173 MG/DL (ref 65–99)
HCT VFR BLD AUTO: 38 % (ref 34–46.6)
HGB BLD-MCNC: 12 G/DL (ref 12–15.9)
IMM GRANULOCYTES # BLD AUTO: 0.02 10*3/MM3 (ref 0–0.05)
IMM GRANULOCYTES NFR BLD AUTO: 0.3 % (ref 0–0.5)
LYMPHOCYTES # BLD AUTO: 0.82 10*3/MM3 (ref 0.7–3.1)
LYMPHOCYTES NFR BLD AUTO: 13 % (ref 19.6–45.3)
MCH RBC QN AUTO: 29.6 PG (ref 26.6–33)
MCHC RBC AUTO-ENTMCNC: 31.6 G/DL (ref 31.5–35.7)
MCV RBC AUTO: 93.8 FL (ref 79–97)
MONOCYTES # BLD AUTO: 0.41 10*3/MM3 (ref 0.1–0.9)
MONOCYTES NFR BLD AUTO: 6.5 % (ref 5–12)
NEUTROPHILS # BLD AUTO: 4.71 10*3/MM3 (ref 1.7–7)
NEUTROPHILS NFR BLD AUTO: 74.8 % (ref 42.7–76)
NRBC BLD AUTO-RTO: 0 /100 WBC (ref 0–0.2)
PLATELET # BLD AUTO: 316 10*3/MM3 (ref 140–450)
PMV BLD AUTO: 9 FL (ref 6–12)
POTASSIUM BLD-SCNC: 3.9 MMOL/L (ref 3.5–5.2)
PROT SERPL-MCNC: 6.6 G/DL (ref 6–8.5)
RBC # BLD AUTO: 4.05 10*6/MM3 (ref 3.77–5.28)
SODIUM BLD-SCNC: 141 MMOL/L (ref 136–145)
WBC NRBC COR # BLD: 6.3 10*3/MM3 (ref 3.4–10.8)

## 2019-09-07 PROCEDURE — 25010000002 MORPHINE PER 10 MG: Performed by: EMERGENCY MEDICINE

## 2019-09-07 PROCEDURE — 85025 COMPLETE CBC W/AUTO DIFF WBC: CPT | Performed by: EMERGENCY MEDICINE

## 2019-09-07 PROCEDURE — 99284 EMERGENCY DEPT VISIT MOD MDM: CPT

## 2019-09-07 PROCEDURE — 80053 COMPREHEN METABOLIC PANEL: CPT | Performed by: EMERGENCY MEDICINE

## 2019-09-07 PROCEDURE — 25010000002 ONDANSETRON PER 1 MG: Performed by: EMERGENCY MEDICINE

## 2019-09-07 PROCEDURE — 96374 THER/PROPH/DIAG INJ IV PUSH: CPT

## 2019-09-07 PROCEDURE — 96375 TX/PRO/DX INJ NEW DRUG ADDON: CPT

## 2019-09-07 PROCEDURE — 73030 X-RAY EXAM OF SHOULDER: CPT

## 2019-09-07 RX ORDER — ONDANSETRON 2 MG/ML
4 INJECTION INTRAMUSCULAR; INTRAVENOUS ONCE
Status: COMPLETED | OUTPATIENT
Start: 2019-09-07 | End: 2019-09-07

## 2019-09-07 RX ORDER — MORPHINE SULFATE 2 MG/ML
2 INJECTION, SOLUTION INTRAMUSCULAR; INTRAVENOUS ONCE
Status: COMPLETED | OUTPATIENT
Start: 2019-09-07 | End: 2019-09-07

## 2019-09-07 RX ORDER — SODIUM CHLORIDE 0.9 % (FLUSH) 0.9 %
10 SYRINGE (ML) INJECTION AS NEEDED
Status: DISCONTINUED | OUTPATIENT
Start: 2019-09-07 | End: 2019-09-08 | Stop reason: HOSPADM

## 2019-09-07 RX ORDER — OXYCODONE HYDROCHLORIDE AND ACETAMINOPHEN 5; 325 MG/1; MG/1
1 TABLET ORAL ONCE
Status: COMPLETED | OUTPATIENT
Start: 2019-09-08 | End: 2019-09-08

## 2019-09-07 RX ADMIN — MORPHINE SULFATE 2 MG: 2 INJECTION, SOLUTION INTRAMUSCULAR; INTRAVENOUS at 23:13

## 2019-09-07 RX ADMIN — ONDANSETRON 4 MG: 2 INJECTION INTRAMUSCULAR; INTRAVENOUS at 23:13

## 2019-09-08 VITALS
DIASTOLIC BLOOD PRESSURE: 67 MMHG | TEMPERATURE: 97.8 F | SYSTOLIC BLOOD PRESSURE: 141 MMHG | RESPIRATION RATE: 16 BRPM | WEIGHT: 198 LBS | HEIGHT: 67 IN | HEART RATE: 85 BPM | OXYGEN SATURATION: 99 % | BODY MASS INDEX: 31.08 KG/M2

## 2019-09-08 RX ORDER — ONDANSETRON 4 MG/1
4 TABLET, ORALLY DISINTEGRATING ORAL EVERY 8 HOURS PRN
Qty: 15 TABLET | Refills: 0 | Status: SHIPPED | OUTPATIENT
Start: 2019-09-08 | End: 2019-10-07

## 2019-09-08 RX ORDER — OXYCODONE HYDROCHLORIDE AND ACETAMINOPHEN 5; 325 MG/1; MG/1
1 TABLET ORAL EVERY 6 HOURS PRN
Qty: 15 TABLET | Refills: 0 | Status: SHIPPED | OUTPATIENT
Start: 2019-09-08 | End: 2019-10-07

## 2019-09-08 RX ADMIN — OXYCODONE HYDROCHLORIDE AND ACETAMINOPHEN 1 TABLET: 5; 325 TABLET ORAL at 00:16

## 2019-09-08 NOTE — ED PROVIDER NOTES
EMERGENCY DEPARTMENT ENCOUNTER    Room Number:  28/28  Date seen:  9/8/2019  Time seen: 10:55 PM  PCP: Srini Gustafson MD    HPI:  Chief complaint: Left arm pain  Context:Nenita Tavares is a 88 y.o. female who presents to the ED with c/o worsening left upper arm pain secondary to a mechanical fall that occurred just PTA. Pt was wearing socks and slipped on a hardwood floor in her house. She landed on her left shoulder when she fell. Her arm pain is aggravated with any movement of the left arm. Pt denies head trauma, LOC, elbow pain, CP, SOA, and other injures from fall. Pt is not on blood thinners.     Onset: sudden  Location: left shoulder, upper arm  Duration: occurred just PTA  Timing: constant   Character: sharp pain  Aggravating Factors: movement of left arm  Alleviating Factors: none   Severity: moderate   Associated Symptoms: mechanical fall       ALLERGIES  Patient has no known allergies.    PAST MEDICAL HISTORY  Active Ambulatory Problems     Diagnosis Date Noted   • HLD (hyperlipidemia) 06/20/2016   • Irritable bowel syndrome with diarrhea 06/20/2016   • MCI (mild cognitive impairment) 01/02/2018     Resolved Ambulatory Problems     Diagnosis Date Noted   • Neuralgia neuritis, sciatic nerve 06/20/2016     Past Medical History:   Diagnosis Date   • Allergic    • Arthritis    • GERD (gastroesophageal reflux disease)    • High cholesterol    • History of EKG    • HL (hearing loss)    • IBS (irritable bowel syndrome)        PAST SURGICAL HISTORY  Past Surgical History:   Procedure Laterality Date   • APPENDECTOMY     • BREAST LUMPECTOMY     • CARDIAC CATHETERIZATION     • CHOLECYSTECTOMY     • ELBOW ARTHROSCOPY     • GALLBLADDER SURGERY     • JOINT REPLACEMENT     • PARTIAL HYSTERECTOMY     • REPLACEMENT TOTAL KNEE     • TONSILLECTOMY         FAMILY HISTORY  Family History   Problem Relation Age of Onset   • Stroke Mother    • Arthritis Mother    • Alzheimer's disease Father    • Cancer Brother        SOCIAL  HISTORY  Social History     Socioeconomic History   • Marital status:      Spouse name: Not on file   • Number of children: Not on file   • Years of education: Not on file   • Highest education level: Not on file   Tobacco Use   • Smoking status: Never Smoker   • Smokeless tobacco: Never Used   Substance and Sexual Activity   • Alcohol use: Yes     Comment: social drink   • Drug use: No   • Sexual activity: Not Currently     Partners: Male     Birth control/protection: Abstinence       REVIEW OF SYSTEMS  Review of Systems   Constitutional: Negative for fever.        Fall without head trauma +   HENT: Negative for sore throat.    Eyes: Negative.    Respiratory: Negative for cough and shortness of breath.    Cardiovascular: Negative for chest pain.   Gastrointestinal: Negative for abdominal pain, diarrhea and vomiting.   Genitourinary: Negative for dysuria.   Musculoskeletal: Positive for arthralgias (L arm pain). Negative for neck pain.   Skin: Negative for rash.   Allergic/Immunologic: Negative.    Neurological: Negative for syncope, weakness, numbness and headaches.   Hematological: Negative.    Psychiatric/Behavioral: Negative.    All other systems reviewed and are negative.      PHYSICAL EXAM  ED Triage Vitals [09/07/19 2253]   Temp Heart Rate Resp BP SpO2   -- 88 16 -- 100 %      Temp src Heart Rate Source Patient Position BP Location FiO2 (%)   -- -- -- -- --     Physical Exam   Constitutional: She is oriented to person, place, and time. No distress.   HENT:   Head: Normocephalic and atraumatic.   Eyes: EOM are normal. Pupils are equal, round, and reactive to light.   Neck: Normal range of motion. Neck supple.   Cardiovascular: Normal rate, regular rhythm, normal heart sounds and intact distal pulses.   Pulmonary/Chest: Effort normal and breath sounds normal. No respiratory distress. She exhibits no tenderness.   Abdominal: Soft. There is no tenderness. There is no rebound and no guarding.    Musculoskeletal: Normal range of motion. She exhibits no edema.        Left elbow: No tenderness found.        Cervical back: She exhibits no tenderness.        Thoracic back: She exhibits no tenderness.        Lumbar back: She exhibits no tenderness.        Left upper arm: She exhibits tenderness (to proximal humerous ).   Intact radial pulses    Neurological: She is alert and oriented to person, place, and time. She has normal sensation and normal strength.   Skin: Skin is warm and dry. No rash noted.   Psychiatric: Mood and affect normal.   Nursing note and vitals reviewed.      LAB RESULTS  Recent Results (from the past 24 hour(s))   Comprehensive Metabolic Panel    Collection Time: 09/07/19 11:08 PM   Result Value Ref Range    Glucose 173 (H) 65 - 99 mg/dL    BUN 23 8 - 23 mg/dL    Creatinine 0.95 0.57 - 1.00 mg/dL    Sodium 141 136 - 145 mmol/L    Potassium 3.9 3.5 - 5.2 mmol/L    Chloride 103 98 - 107 mmol/L    CO2 25.0 22.0 - 29.0 mmol/L    Calcium 9.2 8.6 - 10.5 mg/dL    Total Protein 6.6 6.0 - 8.5 g/dL    Albumin 4.00 3.50 - 5.20 g/dL    ALT (SGPT) 12 1 - 33 U/L    AST (SGOT) 17 1 - 32 U/L    Alkaline Phosphatase 82 39 - 117 U/L    Total Bilirubin 0.2 0.2 - 1.2 mg/dL    eGFR Non African Amer 56 (L) >60 mL/min/1.73    Globulin 2.6 gm/dL    A/G Ratio 1.5 g/dL    BUN/Creatinine Ratio 24.2 7.0 - 25.0    Anion Gap 13.0 5.0 - 15.0 mmol/L   CBC Auto Differential    Collection Time: 09/07/19 11:08 PM   Result Value Ref Range    WBC 6.30 3.40 - 10.80 10*3/mm3    RBC 4.05 3.77 - 5.28 10*6/mm3    Hemoglobin 12.0 12.0 - 15.9 g/dL    Hematocrit 38.0 34.0 - 46.6 %    MCV 93.8 79.0 - 97.0 fL    MCH 29.6 26.6 - 33.0 pg    MCHC 31.6 31.5 - 35.7 g/dL    RDW 12.7 12.3 - 15.4 %    RDW-SD 44.4 37.0 - 54.0 fl    MPV 9.0 6.0 - 12.0 fL    Platelets 316 140 - 450 10*3/mm3    Neutrophil % 74.8 42.7 - 76.0 %    Lymphocyte % 13.0 (L) 19.6 - 45.3 %    Monocyte % 6.5 5.0 - 12.0 %    Eosinophil % 4.4 0.3 - 6.2 %    Basophil % 1.0 0.0  "- 1.5 %    Immature Grans % 0.3 0.0 - 0.5 %    Neutrophils, Absolute 4.71 1.70 - 7.00 10*3/mm3    Lymphocytes, Absolute 0.82 0.70 - 3.10 10*3/mm3    Monocytes, Absolute 0.41 0.10 - 0.90 10*3/mm3    Eosinophils, Absolute 0.28 0.00 - 0.40 10*3/mm3    Basophils, Absolute 0.06 0.00 - 0.20 10*3/mm3    Immature Grans, Absolute 0.02 0.00 - 0.05 10*3/mm3    nRBC 0.0 0.0 - 0.2 /100 WBC       I ordered the above labs and reviewed the results    RADIOLOGY  XR Shoulder 2+ View Left   Final Result   Proximal humerus fracture without dislocation.       I ordered the above noted radiological studies and reviewed the images on the PACS system.      MEDICATIONS GIVEN IN ER  Medications   morphine injection 2 mg (2 mg Intravenous Given 9/7/19 2313)   ondansetron (ZOFRAN) injection 4 mg (4 mg Intravenous Given 9/7/19 2313)   oxyCODONE-acetaminophen (PERCOCET) 5-325 MG per tablet 1 tablet (1 tablet Oral Given 9/8/19 0016)       PROCEDURES  Procedures      PROGRESS AND CONSULTS   11:06 PM  XR left shoulder and labs ordered. Zofran and Morphine ordered for pain.    11:38 PM  Shoulder immobilizer ordered.     11:53 PM  Rechecked pt who is resting in NAD. Informed pt of proximal humerus fracture. Pt c/o pain. Plan to discharge pt with shoulder immobilizer and referral to ortho. I advised pt to use a cane when ambulating. Pt understands and agrees with the plan, all questions answered.  Percocet ordered for pain.     12:36 AM  Reviewed pt's history and workup with Dr. Hunt.  After a bedside evaluation; Dr Hunt agrees with the plan of care      Disposition vitals:  /67 (BP Location: Right arm, Patient Position: Sitting)   Pulse 85   Temp 97.8 °F (36.6 °C) (Oral)   Resp 16   Ht 170.2 cm (67\")   Wt 89.8 kg (198 lb)   SpO2 99%   BMI 31.01 kg/m²       DIAGNOSIS  Final diagnoses:   Closed fracture of head of left humerus, initial encounter       DISPOSITION   DISCHARGE    Patient discharged in stable condition.    Reviewed " implications of results, diagnosis, meds, responsibility to follow up, warning signs and symptoms of possible worsening, potential complications and reasons to return to ER.    Patient/Family voiced understanding of above instructions.    Discussed plan for discharge, as there is no emergent indication for admission. Patient referred to primary care provider for BP management due to today's BP. Pt/family is agreeable and understands need for follow up and repeat testing.  Pt is aware that discharge does not mean that nothing is wrong but it indicates no emergency is present that requires admission and they must continue care with follow-up as given below or physician of their choice.     FOLLOW-UP  Vincenzo Uriostegui MD  [address] [facility] [address]  [place] [address]  [phone]    Schedule an appointment as soon as possible for a visit   For further evaluation and treatment    Srini Gustafson MD  [address]  [place] [id number] [phone]    Schedule an appointment as soon as possible for a visit   For further evaluation and treatment         Medication List      New Prescriptions    ondansetron ODT 4 MG disintegrating tablet  Commonly known as:  ZOFRAN ODT  Take 1 tablet by mouth Every 8 (Eight) Hours As Needed for Nausea or   Vomiting. Take with pain medication if makes you nauseated     oxyCODONE-acetaminophen 5-325 MG per tablet  Commonly known as:  PERCOCET  Take 1 tablet by mouth Every 6 (Six) Hours As Needed for Severe Pain .            Documentation assistance provided by pineda Frias for Steven Morrow PA-C.  Information recorded by the scribe was done at my direction and has been verified and validated by me.       Keren Frias  09/08/19 0048       Srini Morrow III, PA  09/08/19 5031

## 2019-09-08 NOTE — ED TRIAGE NOTES
Patient states that she slipped and fell on her left arm. Patient states that she is unable to move her left arm.

## 2019-09-09 ENCOUNTER — TELEPHONE (OUTPATIENT)
Dept: ORTHOPEDIC SURGERY | Facility: CLINIC | Age: 84
End: 2019-09-09

## 2019-09-09 NOTE — TELEPHONE ENCOUNTER
called for appt with [facility] for left shoulder fracture. Was seen at [facility] ER on 9/7 and advised to f/u with [facility]. (patient has [facility] chart-[facility])

## 2019-09-11 ENCOUNTER — OFFICE VISIT (OUTPATIENT)
Dept: ORTHOPEDIC SURGERY | Facility: CLINIC | Age: 84
End: 2019-09-11

## 2019-09-11 VITALS — HEIGHT: 67 IN | WEIGHT: 198 LBS | BODY MASS INDEX: 31.08 KG/M2

## 2019-09-11 DIAGNOSIS — S42.90XA CLOSED FRACTURE OF SHOULDER, UNSPECIFIED LATERALITY, INITIAL ENCOUNTER: Primary | ICD-10-CM

## 2019-09-11 PROCEDURE — 23600 CLTX PROX HUMRL FX W/O MNPJ: CPT | Performed by: ORTHOPAEDIC SURGERY

## 2019-09-11 PROCEDURE — 99204 OFFICE O/P NEW MOD 45 MIN: CPT | Performed by: ORTHOPAEDIC SURGERY

## 2019-09-11 RX ORDER — IBUPROFEN 200 MG
200 TABLET ORAL EVERY 6 HOURS PRN
COMMUNITY
End: 2020-12-09

## 2019-09-11 RX ORDER — OXYCODONE HYDROCHLORIDE AND ACETAMINOPHEN 5; 325 MG/1; MG/1
1 TABLET ORAL EVERY 4 HOURS PRN
Qty: 50 TABLET | Refills: 0 | Status: SHIPPED | OUTPATIENT
Start: 2019-09-11 | End: 2019-10-07

## 2019-09-11 NOTE — PROGRESS NOTES
History & Physical       Patient: Nenita Tavares    YOB: 1931    Medical Record Number: [id number]    Chief Complaints: Left shoulder injury    History of Present Illness: 88 y.o. female presents for evaluation of the left shoulder.  The injury was sustained in a fall on September 7.  Describes the pain as moderate, constant, and aching.  The pain is worse with movement.  The pain is better with rest, pain medicine and use of the sling.  Denies any other injuries or complaints.    Allergies: No Known Allergies    Home Medications:      Current Outpatient Medications:   •  colestipol (COLESTID) 1 g tablet, Take 1 tablet by mouth 2 (Two) Times a Day., Disp: , Rfl:   •  ibuprofen (ADVIL,MOTRIN) 200 MG tablet, Take 200 mg by mouth Every 6 (Six) Hours As Needed for Mild Pain ., Disp: , Rfl:   •  Loperamide HCl (IMODIUM PO), Take  by mouth as needed., Disp: , Rfl:   •  nortriptyline (PAMELOR) 10 MG capsule, Take  by mouth., Disp: , Rfl:   •  omeprazole (PRILOSEC) 20 MG capsule, Take  by mouth., Disp: , Rfl:   •  oxyCODONE-acetaminophen (PERCOCET) 5-325 MG per tablet, Take 1 tablet by mouth Every 6 (Six) Hours As Needed for Severe Pain ., Disp: 15 tablet, Rfl: 0  •  baclofen (LIORESAL) 10 MG tablet, Take 1 tablet by mouth 3 (Three) Times a Day As Needed for Muscle Spasms., Disp: 30 tablet, Rfl: 0  •  Lactase (LACTAID PO), Take  by mouth as needed., Disp: , Rfl:   •  ondansetron ODT (ZOFRAN ODT) 4 MG disintegrating tablet, Take 1 tablet by mouth Every 8 (Eight) Hours As Needed for Nausea or Vomiting. Take with pain medication if makes you nauseated, Disp: 15 tablet, Rfl: 0  •  Probiotic Product (PROBIOTIC DAILY PO), Take  by mouth., Disp: , Rfl:     Past Medical History:   Diagnosis Date   • Allergic    • Arthritis    • GERD (gastroesophageal reflux disease)    • High cholesterol    • History of EKG    • HL (hearing loss)    • IBS (irritable bowel syndrome)           Past Surgical History:   Procedure  "Laterality Date   • APPENDECTOMY     • BREAST LUMPECTOMY     • CARDIAC CATHETERIZATION     • CHOLECYSTECTOMY     • ELBOW ARTHROSCOPY     • GALLBLADDER SURGERY     • JOINT REPLACEMENT     • PARTIAL HYSTERECTOMY     • REPLACEMENT TOTAL KNEE     • TONSILLECTOMY            Social History     Occupational History   • Not on file   Tobacco Use   • Smoking status: Never Smoker   • Smokeless tobacco: Never Used   Substance and Sexual Activity   • Alcohol use: Yes     Comment: social drink   • Drug use: No   • Sexual activity: Not Currently     Partners: Male     Birth control/protection: Abstinence      Social History     Social History Narrative   • Not on file          Family History   Problem Relation Age of Onset   • Stroke Mother    • Arthritis Mother    • Alzheimer's disease Father    • Cancer Brother        Review of Systems:      Constitutional: Denies fever, shaking or chills   Eyes: Denies change in visual acuity   HEENT: Denies nasal congestion or sore throat   Respiratory: Denies cough or shortness of breath   Cardiovascular: Denies chest pain or edema  Endocrine: Denies tremors, palpitations, intolerance of heat or cold, polyuria, polydipsia.  GI: Denies abdominal pain, nausea, vomiting, bloody stools or diarrhea  : Denies frequency, urgency, incontinence, retention, or nocturia.  Musculoskeletal: Denies numbness, tingling or loss of motor function except as above  Integument: Denies rash, lesion or ulceration   Neurologic: Denies headache or focal weakness, deficits  Heme: Denies spontaneous or excessive bleeding, epistaxis, hematuria, melena, fatigue, enlarged or tender lymph nodes.      All other pertinent positives and negatives as noted above in HPI.    Physical Exam: 88 y.o. female    Vitals:    09/11/19 1246   Weight: 89.8 kg (198 lb)   Height: 170.2 cm (67\")       General:  Patient is awake and alert.  Appears in no acute distress or discomfort.    Psych:  Affect and demeanor are appropriate.    Eyes: "  Conjunctiva and sclera appear grossly normal.  Eyes track well and EOM seem to be intact.    Dentition:  No gross abnormalities noted.    Ears:  No gross abnormalities.  Hearing adequate for the exam.    Cardiovascular:  Regular rate and rhythm.    Lungs:  Good chest expansion.  Breathing unlabored.    Lymph:  No palpable masses or adenopathy in the affected extremity    Left upper extremity:  Sling was in place and removed.  Skin appears benign.  She does have ecchymosis down her arm.  No gross malalignment, lacerations or abrasions.  Focal tenderness noted over the proximal humerus and shoulder.  No palpable masses or adenopathy.  Compartments soft.  Painful, limited ROM of the shoulder.  Could not assess stability.  No tenderness noted over the lower arm, elbow, forearm, wrist or hand.  Good strength in the wrist with flexion and extension as well as , pinch, finger and thumb abduction strength.  Intact sensation.  Brisk cap refill.  Palpable radial pulse.      Diagnostic Tests:  Lab Results   Component Value Date    GLUCOSE 173 (H) 09/07/2019    CALCIUM 9.2 09/07/2019     09/07/2019    K 3.9 09/07/2019    CO2 25.0 09/07/2019     09/07/2019    BUN 23 09/07/2019    CREATININE 0.95 09/07/2019    EGFRIFNONA 56 (L) 09/07/2019    BCR 24.2 09/07/2019    ANIONGAP 13.0 09/07/2019     Lab Results   Component Value Date    WBC 6.30 09/07/2019    HGB 12.0 09/07/2019    HCT 38.0 09/07/2019    MCV 93.8 09/07/2019     09/07/2019     No results found for: INR, PROTIME    Imaging:  Outside AP and orthogonal views of the left shoulder are reviewed.  No comparison films are available.  The x-rays show a comminuted proximal humerus fracture.  From a technical standpoint, it appears to be a three-part fracture.  The head appears to have fallen into slight varus.  There is less than a centimeter of tuberosity displacement.    Assessment:  Left proximal humerus fracture    Plan: We had a thorough discussion  regarding the risks of non-surgical management versus surgery.  With closed treatment, there is the risk of further displacement, malunion, nonunion or persistent pain or loss of motion/function that could require further intervention in the future.  I explained to the patient that there is good evidence that patients can function well even with proximal humerus malunions.  I recommend closed treatment.  The patient voiced understanding of the risks, benefits, and alternative forms of treatment that were discussed and the patient consents to proceed with closed treatment.  The patient is instructed to continue use of the sling for comfort.  We will need to reconvene in 2 weeks for repeat x-rays and reevaluation.  I did agree to refill her prescription for Percocet.  Risks of the medicine were discussed and she acknowledged understanding of this information.    Date: 9/11/2019    Vincenzo Uriostegui MD    CC to Srini Gustafson MD

## 2019-09-25 ENCOUNTER — OFFICE VISIT (OUTPATIENT)
Dept: ORTHOPEDIC SURGERY | Facility: CLINIC | Age: 84
End: 2019-09-25

## 2019-09-25 VITALS — TEMPERATURE: 98.7 F | BODY MASS INDEX: 30.29 KG/M2 | WEIGHT: 193 LBS | HEIGHT: 67 IN

## 2019-09-25 DIAGNOSIS — S42.90XA CLOSED FRACTURE OF SHOULDER, UNSPECIFIED LATERALITY, INITIAL ENCOUNTER: Primary | ICD-10-CM

## 2019-09-25 PROCEDURE — 99024 POSTOP FOLLOW-UP VISIT: CPT | Performed by: ORTHOPAEDIC SURGERY

## 2019-09-25 PROCEDURE — 73060 X-RAY EXAM OF HUMERUS: CPT | Performed by: ORTHOPAEDIC SURGERY

## 2019-09-25 RX ORDER — ZOLPIDEM TARTRATE 10 MG/1
5 TABLET ORAL NIGHTLY PRN
Qty: 20 TABLET | Refills: 0 | Status: SHIPPED | OUTPATIENT
Start: 2019-09-25 | End: 2019-10-07

## 2019-09-25 NOTE — PROGRESS NOTES
Nenita Tavares : 5/3/1931 MRN: [id number] DATE: 2019    CC: Closed treatment of left proximal humerus fracture    HPI: Pt. returns to clinic today stating pain is improved.  Denies any new concerns or issues.    Vitals:    19 1504   Temp: 98.7 °F (37.1 °C)       Exam:  Contour of shoulder appears normal.  Skin intact and benign.  Arm and forearm soft.  Shoulder moves fluidly with pendulum exercises.  Good motor and sensory function distally.  Palpable pulses with good cap refill.      Imaginv xrays including AP and scapular Y are ordered and reviewed by me to evaluate alignment and for comparison purposes.  No new or concerning findings noted. Fracture appears in unchanged alignment.    Impression:   2 weeks s/p closed treatment of left proximal humerus fracture    Plan:    1.  Continue use of sling.  2.  Can begin working on pendulum exercises.  3.  F/u in 2 weeks with repeat 2v xrays.  4.  Counseled the patient about appropriate activity modifications and restrictions, including no driving.  5.  She does mention some difficulty sleeping and would like a sleep medicine.  I agreed to give her a Rx for Ambien.  Risks were discussed.    Vincenzo Uriostegui MD

## 2019-10-07 ENCOUNTER — OFFICE VISIT (OUTPATIENT)
Dept: FAMILY MEDICINE CLINIC | Facility: CLINIC | Age: 84
End: 2019-10-07

## 2019-10-07 VITALS
WEIGHT: 188.5 LBS | SYSTOLIC BLOOD PRESSURE: 141 MMHG | DIASTOLIC BLOOD PRESSURE: 81 MMHG | BODY MASS INDEX: 29.58 KG/M2 | HEART RATE: 91 BPM | TEMPERATURE: 97.8 F | HEIGHT: 67 IN | OXYGEN SATURATION: 97 %

## 2019-10-07 DIAGNOSIS — J40 BRONCHITIS: Primary | ICD-10-CM

## 2019-10-07 PROCEDURE — 99213 OFFICE O/P EST LOW 20 MIN: CPT | Performed by: FAMILY MEDICINE

## 2019-10-07 NOTE — PROGRESS NOTES
"Subjective   Nenita Tavares is a 88 y.o. female.     Chief Complaint   Patient presents with   • Cough     x over a month nothing helping   • Anorexia   • Sleeping Problem     not sleeping well         History of Present Illness    Mild to moderate cough.  Nuisance level.  Daytime mostly.  Taking Delsym which helps.  No fever.  Felt a little chilled yesterday and cold.  However no bad shaking chills.  No shortness of breath.  Some sinus symptoms.  No sore throat.  She fractured her left humerus, humeral neck fracture with impaction.  She is been in a sling for a month.  She was on oxycodone for a few days.  They gave her Ambien and she took it 1 cm have nightmares.  He is having some trouble sleeping because of the discomfort.  She taking ibuprofen.  She is had reactive depression symptoms.  Crying at times.  Getting a little bit better since the pains been improving.  Felt fine before the fall.  She slipped on the floor wearing socks.      The following portions of the patient's history were reviewed and updated as appropriate: allergies, current medications, past family history, past medical history, past social history, past surgical history and problem list.          Review of Systems   Constitutional: Negative for fatigue and fever.   Respiratory: Positive for cough.    Gastrointestinal: Negative.    Skin: Negative for rash.   Psychiatric/Behavioral: Positive for dysphoric mood.       Objective   Blood pressure 141/81, pulse 91, temperature 97.8 °F (36.6 °C), temperature source Oral, height 170.2 cm (67.01\"), weight 85.5 kg (188 lb 8 oz), SpO2 97 %.  Physical Exam   Constitutional: No distress.   No acute distress.  Nontoxic.   HENT:   Right Ear: Tympanic membrane, external ear and ear canal normal.   Left Ear: Tympanic membrane, external ear and ear canal normal.   Nose: Nose normal.   Mouth/Throat: Oropharynx is clear and moist. No oropharyngeal exudate.   Eyes: Conjunctivae are normal. Right eye exhibits no " discharge. Left eye exhibits no discharge. No scleral icterus.   Cardiovascular: Normal rate.   Pulmonary/Chest: Effort normal and breath sounds normal. No stridor. No respiratory distress. She has no wheezes. She has no rales.   No tachypnea   Musculoskeletal:   Left arm in a sling.  She has some ecchymosis over the left biceps.   Lymphadenopathy:     She has no cervical adenopathy.   Skin: No rash noted.   Psychiatric:   Affect is slightly flat.  Mood depressed.   Nursing note and vitals reviewed.      Assessment/Plan   Nenita was seen today for cough, anorexia and sleeping problem.    Diagnoses and all orders for this visit:    Bronchitis    Bronchitis.  Likely viral or postviral.  Unremarkable pulmonary exam today.  No red flags in history.  At this time we are going to continue observation.  Continue Delsym.  If she develops fever or shaking chills, shortness of breath, high fever, or other severe concerns seek medical attention and/or let us know.    Recent left proximal humerus fracture.  She has a mild reactive depression.  At this time no direct indication for depression medication.  She does not want any either.  However if the symptoms persist or worsen she is going to let us know.

## 2019-10-14 ENCOUNTER — OFFICE VISIT (OUTPATIENT)
Dept: ORTHOPEDIC SURGERY | Facility: CLINIC | Age: 84
End: 2019-10-14

## 2019-10-14 VITALS — TEMPERATURE: 97.9 F | BODY MASS INDEX: 27.28 KG/M2 | HEIGHT: 68 IN | WEIGHT: 180 LBS

## 2019-10-14 DIAGNOSIS — S42.90XA CLOSED FRACTURE OF SHOULDER, UNSPECIFIED LATERALITY, INITIAL ENCOUNTER: Primary | ICD-10-CM

## 2019-10-14 PROCEDURE — 99024 POSTOP FOLLOW-UP VISIT: CPT | Performed by: ORTHOPAEDIC SURGERY

## 2019-10-14 PROCEDURE — 73060 X-RAY EXAM OF HUMERUS: CPT | Performed by: ORTHOPAEDIC SURGERY

## 2019-10-15 NOTE — PROGRESS NOTES
"Nenita Tavares : 5/3/1931 MRN: [id number] DATE: 10/14/2019    CC: Closed treatment of left proximal humerus fracture    HPI: Pt. returns to clinic today for follow-up.  Pain is minimal at this point.  Denies any new concerns or issues.  Vitals:    10/14/19 1528   Temp: 97.9 °F (36.6 °C)   Weight: 81.6 kg (180 lb)   Height: 171.5 cm (67.5\")       Exam:   Contour of shoulder appears normal.  Skin intact and benign.  Arm and forearm soft.  Shoulder motion is limited but well tolerated.  Good motor and sensory function distally.  Palpable pulses with good cap refill.      Imaging   2v xrays including AP and scapular Y are ordered and reviewed by me to evaluate alignment and for comparison purposes.  No new or concerning findings noted. Fracture appears to be healing.  No significant change in alignment    Impression:   6 weeks s/p closed treatment of left proximal humerus fracture    Plan:    1.  Continue to work on progressive range of motion.  I will have her start formal PT at this point.  An order for this was entered for her.  2.  F/u in 6 weeks with repeat 2v xrays.  3.  Counseled the patient about appropriate activity modifications and restrictions, including no heavy lifting, pushing or pulling  "

## 2019-10-17 ENCOUNTER — TREATMENT (OUTPATIENT)
Dept: PHYSICAL THERAPY | Facility: CLINIC | Age: 84
End: 2019-10-17

## 2019-10-17 DIAGNOSIS — R68.89 IMPAIRED FUNCTION OF UPPER EXTREMITY: ICD-10-CM

## 2019-10-17 DIAGNOSIS — R29.898 SHOULDER WEAKNESS: ICD-10-CM

## 2019-10-17 DIAGNOSIS — M25.512 LEFT SHOULDER PAIN, UNSPECIFIED CHRONICITY: Primary | ICD-10-CM

## 2019-10-17 PROCEDURE — 97110 THERAPEUTIC EXERCISES: CPT | Performed by: PHYSICAL THERAPIST

## 2019-10-17 PROCEDURE — 97140 MANUAL THERAPY 1/> REGIONS: CPT | Performed by: PHYSICAL THERAPIST

## 2019-10-17 PROCEDURE — 97161 PT EVAL LOW COMPLEX 20 MIN: CPT | Performed by: PHYSICAL THERAPIST

## 2019-10-17 NOTE — PROGRESS NOTES
Physical Therapy Initial Evaluation and Plan of Care    Patient: Nenita Tavares   : 5/3/1931  Diagnosis/ICD-10 Code:  Left shoulder pain, unspecified chronicity [M25.512]  Referring practitioner: Vincenzo Uriostegui MD    Subjective Evaluation    History of Present Illness  Date of onset: 2019  Mechanism of injury: Pt was walking in socks on hardwood floor and slipped and fell onto L shoulder.  Went to ER that evening.   Pt was in sling until 10/15/19    + sleep disturbance; used to sleep on L side and is unable to do that and can only sleep 3 hours at time.    Has had to go to hairdresser to fix hair.      The x-rays show a comminuted proximal humerus fracture.  From a technical standpoint, it appears to be a three-part fracture.  The head appears to have fallen into slight varus.  There is less than a centimeter of tuberosity displacement    PMH - B TKA        Pain  Current pain ratin  Location: mid/prox L humerus   Quality: radiating and dull ache  Aggravating factors: movement, outstretched reach and lifting  Progression: improved    Social Support  Lives with: spouse    Hand dominance: right    Treatments  Previous treatment: immobilization  Patient Goals  Patient goals for therapy: decreased pain and increased motion  Patient goal: resume house cleaning and make my goldie cookies            Objective       Tenderness     Left Shoulder   Tenderness in the subacromial bursa. No tenderness in the clavicle.     Additional Tenderness Details  prox humeral with palpable defect    Active Range of Motion   Left Shoulder   Flexion: 55 degrees with pain  Abduction: 55 degrees with pain    Left Elbow   Flexion: WFL  Extension: 10 (lacking) degrees with pain    Additional Active Range of Motion Details  + shrug    Passive Range of Motion   Left Shoulder   Flexion: 144 degrees   Abduction: 135 degrees   External rotation 45°: 28 degrees   Internal rotation 45°: 52 degrees     Strength/Myotome Testing     Left  Shoulder     Planes of Motion   Flexion: 2+   Abduction: 2+   External rotation at 0°: 2+   Internal rotation at 0°: 3+     Right Shoulder     Planes of Motion   Right shoulder forward flexion strength: + shrug.   Right shoulder abduction strength: + shrug.     Left Elbow   Flexion: 4-  Extension: 4-  Forearm supination: 4-  Forearm pronation: 4-    Tests     Additional Tests Details  + shrug sign         Assessment & Plan     Assessment  Impairments: abnormal or restricted ROM, activity intolerance, impaired physical strength and pain with function  Assessment details:  Nenita Tavares is a pleasant 88 y.o. female that presents presents with signs and symptoms consistent with L shoulder pain following prox humeral fracture 5 1/2 weeks ago . She presents with decreased and painful A/PROM L shoulder/elbow, marked loss of strength L sh/elbow, palp tenderness, + shrug sign indicative of probable RTC tear and + sleep disturbance.  Quick DASH is 55%. Pt would benefit from skilled PT services in order to address listed impairments and increase tolerance to normal daily activities including ADLs, work and recreational activities.       Prognosis: good  Functional Limitations: carrying objects, lifting, sleeping, pushing, uncomfortable because of pain, reaching behind back and reaching overhead  Goals  Plan Goals: STG In 2-6 weeks  1. Pt to exhibit compliance/independence with HEP.  2. Pt to exhibit elbow extension of lacking no more than 5 degrees to allow for improved pushing tolerance.  3. Pt is able to sleep for > 3 hours before awakening with shoulder pain  4. MMT of L shoulder IR/ER >/= 3/5 to allow patient to stir cookie dough    LTG In 6-12 weeks  1. L shoulder flex/abd 3/5 and non-painful to allow for reaching activities.  2. Pain not > than 4/10 with ADLs  3. Quick DASH < 25%  5. Patient to be able to place dishes in overhead cabinet using LUE independently for self-care independence  6. Patient to wash/roll her  hair independently using LUE for self-care  [place]  7. Patient to use LUE to reach across her body for the seat belt and to  fasten the seatbelt while riding in car      Plan  Therapy options: will be seen for skilled physical therapy services  Planned modality interventions: ultrasound, electrical stimulation/Russian stimulation and cryotherapy  Planned therapy interventions: manual therapy, neuromuscular re-education, strengthening, stretching, home exercise program and joint mobilization  Duration in visits: 20  Treatment plan discussed with: patient        Manual Therapy:    8     mins  27962;  Therapeutic Exercise:    15     mins  43821;         Timed Treatment:   23   mins   Total Treatment:     50   mins    PT SIGNATURE: DAWNA Medina License # [id number]  DATE TREATMENT INITIATED: 10/17/2019    Medicare Initial Certification  Certification Period: 1/15/2020  I certify that the therapy services are furnished while this patient is under my care.  The services outlined above are required by this patient, and will be reviewed every 90 days.     PHYSICIAN: Vincenzo Uriostegui MD      DATE:     Please sign and return via fax to [phone].. Thank you, [facility].

## 2019-10-17 NOTE — PATIENT INSTRUCTIONS
Access Code: [email or web address]   URL: [email or web address]   Date: 10/17/2019   Prepared by: Danita Lambert     Exercises   Supine Shoulder External Rotation AAROM with Dowel - 10 reps - 1 sets - 5 hold - 3x daily   Seated Elbow Flexion Extension AROM - 5 reps - 2 sets - 3 hold - 2x daily   Seated Shoulder Shrug - 10 reps - 1 sets - 3 hold - 1x daily   Seated Scapular Retraction - 10 reps - 1 sets - 5 hold - 1x daily   Seated Shoulder Flexion Towel Slide at Table Top - 10 reps - 1 sets - 5 hold - 2x daily     Patient was educated on findings of evaluation, purpose of treatment, and goals for therapy.  Treatment options discussed and questions answered.  Patient was educated on exercises/self treatment/pain relief techniques.

## 2019-10-18 ENCOUNTER — TREATMENT (OUTPATIENT)
Dept: PHYSICAL THERAPY | Facility: CLINIC | Age: 84
End: 2019-10-18

## 2019-10-18 DIAGNOSIS — R29.898 SHOULDER WEAKNESS: ICD-10-CM

## 2019-10-18 DIAGNOSIS — M25.512 LEFT SHOULDER PAIN, UNSPECIFIED CHRONICITY: Primary | ICD-10-CM

## 2019-10-18 DIAGNOSIS — R68.89 IMPAIRED FUNCTION OF UPPER EXTREMITY: ICD-10-CM

## 2019-10-18 PROCEDURE — 97140 MANUAL THERAPY 1/> REGIONS: CPT | Performed by: PHYSICAL THERAPIST

## 2019-10-18 PROCEDURE — 97110 THERAPEUTIC EXERCISES: CPT | Performed by: PHYSICAL THERAPIST

## 2019-10-18 NOTE — PROGRESS NOTES
Physical Therapy Daily Progress Note    Visit # : 2  Nenita Tavares reports: my shoulder was really sore after my first visit.      Subjective     Objective   See Exercise, Manual, and Modality Logs for complete treatment.       Assessment/Plan  Improved tolerance to ex today.  Pt requires RUE assist for exercise progressions today.  Recommended use of home pulleys and pt is going to check at home to see if she might already own some.   Progress strengthening /stabilization /functional activity           Manual Therapy:    15     mins  28990;  Therapeutic Exercise:    25     mins  83329;         Timed Treatment:   40   mins   Total Treatment:     50   mins      Cristal Lambert, PT  Physical Therapist  [place] License # [id number]

## 2019-10-18 NOTE — PATIENT INSTRUCTIONS
Access Code: [email or web address]   URL: [email or web address]   Date: 10/18/2019   Prepared by: Danita Lambert     Exercises   Seated Shoulder Flexion AAROM with Pulley Behind - 10 reps - 2 sets - 3x daily   Seated Flexion Stretch with Swiss Ball - 10 reps - 1 sets - 5 hold - 3x daily   Seated Elbow Flexion Extension AAROM with Dowel into Wall - 5 reps - 2 sets - 2x daily

## 2019-10-22 ENCOUNTER — TREATMENT (OUTPATIENT)
Dept: PHYSICAL THERAPY | Facility: CLINIC | Age: 84
End: 2019-10-22

## 2019-10-22 DIAGNOSIS — R29.898 SHOULDER WEAKNESS: ICD-10-CM

## 2019-10-22 DIAGNOSIS — M25.512 LEFT SHOULDER PAIN, UNSPECIFIED CHRONICITY: Primary | ICD-10-CM

## 2019-10-22 DIAGNOSIS — R68.89 IMPAIRED FUNCTION OF UPPER EXTREMITY: ICD-10-CM

## 2019-10-22 PROCEDURE — 97140 MANUAL THERAPY 1/> REGIONS: CPT | Performed by: PHYSICAL THERAPIST

## 2019-10-22 PROCEDURE — 97530 THERAPEUTIC ACTIVITIES: CPT | Performed by: PHYSICAL THERAPIST

## 2019-10-22 PROCEDURE — 97110 THERAPEUTIC EXERCISES: CPT | Performed by: PHYSICAL THERAPIST

## 2019-10-22 NOTE — PROGRESS NOTES
"Physical Therapy Daily Progress Note      Nenita Tavares reports: left shoulder more uncomfortable \" hurts\" today.  States that she did more activity at the house yesterday(made 1/2 the bed, cleaned around house) and slept on left shoulder a little bit.  Feels this may be contributing factor toward how she is feeling today    Subjective     Objective   -presents ambulating in/out of clinic with straight cane.   Noted absence of left UE arm swing with ambulation.  -guards with PROM activities L UE.all planes this session with manual techniques    See Exercise, Manual, and Modality Logs for complete treatment.   Ice application - frequent/often  Instructed in home pulleys in flex and scaption plane, pain free cervical rot and lat flexion, post sh rolls.    Assessment/Plan Increased subjective complaints L UE this session, feels it may have been due to activity yesterday and how she slept.  Guards with PROM activities all planes L Ue due to discomfort but responds with positively with joint oscillation/mobilization for relaxation and able to passively achieve 90 deg flex and abd by end of session.  Exercise performance is limited this session due to left shoulder pain complains and potential RTC integrity issues.  Reported relief with ice application this session    Other   Continue manual techniques to improve left shoulder ROM. Progress exercise as symptoms allow.           Manual Therapy:    20     mins  95819;  Therapeutic Exercise:     12    mins  23659;     Neuromuscular Stephan:        mins  66619;    Therapeutic Activity:      12    mins  24557;     Gait Training:           mins  35290;     Ultrasound:          mins  09371;    Electrical Stimulation:         mins  05246 ( );      Timed Treatment:  44    mins   Total Treatment:    54  mins    Kong Zepeda, BARRY    Physical Therapist Assistant [place] [id number]    "

## 2019-10-24 ENCOUNTER — TREATMENT (OUTPATIENT)
Dept: PHYSICAL THERAPY | Facility: CLINIC | Age: 84
End: 2019-10-24

## 2019-10-24 DIAGNOSIS — R68.89 IMPAIRED FUNCTION OF UPPER EXTREMITY: ICD-10-CM

## 2019-10-24 DIAGNOSIS — R29.898 SHOULDER WEAKNESS: ICD-10-CM

## 2019-10-24 DIAGNOSIS — M25.512 LEFT SHOULDER PAIN, UNSPECIFIED CHRONICITY: Primary | ICD-10-CM

## 2019-10-24 PROCEDURE — 97140 MANUAL THERAPY 1/> REGIONS: CPT | Performed by: PHYSICAL THERAPIST

## 2019-10-24 PROCEDURE — 97110 THERAPEUTIC EXERCISES: CPT | Performed by: PHYSICAL THERAPIST

## 2019-10-24 NOTE — PROGRESS NOTES
Physical Therapy Daily Progress Note  Visit: 4    Nenita Tavares reports: her shoulder has been really sore. Reports compliance with HEP.     Subjective     Objective   See Exercise, Manual, and Modality Logs for complete treatment.       Assessment/Plan Increased pain and difficulty with cane ER exercise. Plan details: Progress ROM / strengthening / stabilization / functional activity as tolerated     Manual Therapy:     15     mins  68574;  Therapeutic Exercise:      27    mins  12395;     Neuromuscular Stephan:         mins  41574;    Therapeutic Activity:           mins  00500;     Gait Training:            mins  52811;     Ultrasound:           mins  63948;    Electrical Stimulation:          mins  17000 ( );  Dry Needling           mins self-pay  Traction           mins 77966  Canalith Repositioning         mins 15085      Timed Treatment: 42     mins   Total Treatment:    52    mins    Jessica Jonas, PT  [place] License #: [id number]    Physical Therapist

## 2019-10-30 ENCOUNTER — TREATMENT (OUTPATIENT)
Dept: PHYSICAL THERAPY | Facility: CLINIC | Age: 84
End: 2019-10-30

## 2019-10-30 DIAGNOSIS — M25.512 LEFT SHOULDER PAIN, UNSPECIFIED CHRONICITY: Primary | ICD-10-CM

## 2019-10-30 DIAGNOSIS — R29.898 SHOULDER WEAKNESS: ICD-10-CM

## 2019-10-30 DIAGNOSIS — R68.89 IMPAIRED FUNCTION OF UPPER EXTREMITY: ICD-10-CM

## 2019-10-30 PROCEDURE — 97110 THERAPEUTIC EXERCISES: CPT | Performed by: PHYSICAL THERAPIST

## 2019-10-30 PROCEDURE — 97140 MANUAL THERAPY 1/> REGIONS: CPT | Performed by: PHYSICAL THERAPIST

## 2019-11-01 ENCOUNTER — TREATMENT (OUTPATIENT)
Dept: PHYSICAL THERAPY | Facility: CLINIC | Age: 84
End: 2019-11-01

## 2019-11-01 DIAGNOSIS — R68.89 IMPAIRED FUNCTION OF UPPER EXTREMITY: ICD-10-CM

## 2019-11-01 DIAGNOSIS — R29.898 SHOULDER WEAKNESS: ICD-10-CM

## 2019-11-01 DIAGNOSIS — M25.512 LEFT SHOULDER PAIN, UNSPECIFIED CHRONICITY: Primary | ICD-10-CM

## 2019-11-01 PROCEDURE — 97140 MANUAL THERAPY 1/> REGIONS: CPT | Performed by: PHYSICAL THERAPIST

## 2019-11-05 ENCOUNTER — TREATMENT (OUTPATIENT)
Dept: PHYSICAL THERAPY | Facility: CLINIC | Age: 84
End: 2019-11-05

## 2019-11-05 DIAGNOSIS — R29.898 SHOULDER WEAKNESS: ICD-10-CM

## 2019-11-05 DIAGNOSIS — R68.89 IMPAIRED FUNCTION OF UPPER EXTREMITY: ICD-10-CM

## 2019-11-05 DIAGNOSIS — M25.512 LEFT SHOULDER PAIN, UNSPECIFIED CHRONICITY: Primary | ICD-10-CM

## 2019-11-05 PROCEDURE — 97110 THERAPEUTIC EXERCISES: CPT | Performed by: PHYSICAL THERAPIST

## 2019-11-05 NOTE — PROGRESS NOTES
Physical Therapy Daily Progress Note  Visit: 7    Nenita Tavares reports: she think the weather may be making her shoulder ache more.     Subjective     Objective   See Exercise, Manual, and Modality Logs for complete treatment.       Assessment/Plan  Compliant/cooperative with current rehab efforts. Plan details: Progress ROM / strengthening / stabilization / functional activity as tolerated       Manual Therapy:          mins  03033;  Therapeutic Exercise:     30     mins  80612;     Neuromuscular Stephan:         mins  41503;    Therapeutic Activity:           mins  49957;     Gait Training:            mins  72869;     Ultrasound:           mins  75382;    Electrical Stimulation:          mins  58195 ( );  Dry Needling           mins self-pay  Traction           mins 24356  Canalith Repositioning         mins 44823      Timed Treatment:  30    mins   Total Treatment:    30    mins    Jessica Jonas, PT  [place] License #: [id number]    Physical Therapist

## 2019-11-12 ENCOUNTER — TREATMENT (OUTPATIENT)
Dept: PHYSICAL THERAPY | Facility: CLINIC | Age: 84
End: 2019-11-12

## 2019-11-12 DIAGNOSIS — R29.898 SHOULDER WEAKNESS: ICD-10-CM

## 2019-11-12 DIAGNOSIS — M25.512 LEFT SHOULDER PAIN, UNSPECIFIED CHRONICITY: Primary | ICD-10-CM

## 2019-11-12 DIAGNOSIS — R68.89 IMPAIRED FUNCTION OF UPPER EXTREMITY: ICD-10-CM

## 2019-11-12 PROCEDURE — 97110 THERAPEUTIC EXERCISES: CPT | Performed by: PHYSICAL THERAPIST

## 2019-11-12 NOTE — PROGRESS NOTES
Physical Therapy Daily Progress Note  Visit: 8    Nenita Tavares reports: her shoulder ROM is doing better. Reports she is now able to fix her own hair.     Subjective     Objective   See Exercise, Manual, and Modality Logs for complete treatment. Noted improved AROM of her shoulder.       Assessment/Plan Little tolerance to exercise. AROM progressing. Plan details: Progress ROM / strengthening / stabilization / functional activity as tolerated     Manual Therapy:          mins  87899;  Therapeutic Exercise:  15        mins  07615;     Neuromuscular Stephan:         mins  42760;    Therapeutic Activity:           mins  05443;     Gait Training:            mins  82057;     Ultrasound:           mins  05563;    Electrical Stimulation:          mins  70937 ( );  Dry Needling           mins self-pay  Traction           mins 81277  Canalith Repositioning         mins 99074      Timed Treatment:   15   mins   Total Treatment:    25    mins    DAWNA Jorgensen License #: [id number]    Physical Therapist

## 2019-11-14 ENCOUNTER — TREATMENT (OUTPATIENT)
Dept: PHYSICAL THERAPY | Facility: CLINIC | Age: 84
End: 2019-11-14

## 2019-11-14 DIAGNOSIS — R68.89 IMPAIRED FUNCTION OF UPPER EXTREMITY: ICD-10-CM

## 2019-11-14 DIAGNOSIS — R29.898 SHOULDER WEAKNESS: ICD-10-CM

## 2019-11-14 DIAGNOSIS — M25.512 LEFT SHOULDER PAIN, UNSPECIFIED CHRONICITY: Primary | ICD-10-CM

## 2019-11-14 PROCEDURE — 97110 THERAPEUTIC EXERCISES: CPT | Performed by: PHYSICAL THERAPIST

## 2019-11-14 NOTE — PROGRESS NOTES
Physical Therapy Daily Progress Note  Visit: 9    Nenita Tavares reports: she continues to have shoulder pain at night but her ROM is better     Subjective     Objective   See Exercise, Manual, and Modality Logs for complete treatment.       Assessment/Plan ROM progressing well. Plan details: Progress ROM / strengthening / stabilization / functional activity as tolerated     Manual Therapy:          mins  26295;  Therapeutic Exercise:      25    mins  68139;     Neuromuscular Stephan:         mins  09880;    Therapeutic Activity:           mins  80687;     Gait Training:            mins  17530;     Ultrasound:           mins  61805;    Electrical Stimulation:          mins  05095 ( );  Dry Needling           mins self-pay  Traction           mins 36054  Canalith Repositioning         mins 36836      Timed Treatment:  25    mins   Total Treatment:     25   mins    Jessica Jonas, PT  [place] License #: [id number]    Physical Therapist

## 2019-11-18 ENCOUNTER — OFFICE VISIT (OUTPATIENT)
Dept: ORTHOPEDIC SURGERY | Facility: CLINIC | Age: 84
End: 2019-11-18

## 2019-11-18 VITALS — WEIGHT: 185 LBS | TEMPERATURE: 97.9 F | BODY MASS INDEX: 29.03 KG/M2 | HEIGHT: 67 IN

## 2019-11-18 DIAGNOSIS — S42.90XA CLOSED FRACTURE OF SHOULDER, UNSPECIFIED LATERALITY, INITIAL ENCOUNTER: Primary | ICD-10-CM

## 2019-11-18 PROCEDURE — 73060 X-RAY EXAM OF HUMERUS: CPT | Performed by: ORTHOPAEDIC SURGERY

## 2019-11-18 PROCEDURE — 99024 POSTOP FOLLOW-UP VISIT: CPT | Performed by: ORTHOPAEDIC SURGERY

## 2019-11-19 NOTE — PROGRESS NOTES
Nenita Tavares : 5/3/1931 MRN: [id number] DATE: 2019    CC:  3 months s/p closed treatment left proximal humerus fracture    HPI: Pt. returns to clinic today stating pain is improved.  She reports no pain at rest.  Motion is progressing.  Denies any new concerns or issues.  PT is helping but it is difficult for her to get to therapy and she wants to transition to a home program.      Current Outpatient Medications:   •  colestipol (COLESTID) 1 g tablet, Take 1 tablet by mouth 2 (Two) Times a Day., Disp: , Rfl:   •  ibuprofen (ADVIL,MOTRIN) 200 MG tablet, Take 200 mg by mouth Every 6 (Six) Hours As Needed for Mild Pain ., Disp: , Rfl:   •  Lactase (LACTAID PO), Take  by mouth as needed., Disp: , Rfl:   •  nortriptyline (PAMELOR) 10 MG capsule, Take  by mouth., Disp: , Rfl:   •  omeprazole (PRILOSEC) 20 MG capsule, Take  by mouth., Disp: , Rfl:   •  Probiotic Product (PROBIOTIC DAILY PO), Take  by mouth., Disp: , Rfl:     Past Medical History:   Diagnosis Date   • Allergic    • Arthritis    • GERD (gastroesophageal reflux disease)    • High cholesterol    • History of EKG    • HL (hearing loss)    • IBS (irritable bowel syndrome)        Past Surgical History:   Procedure Laterality Date   • APPENDECTOMY     • BREAST LUMPECTOMY     • CARDIAC CATHETERIZATION     • CHOLECYSTECTOMY     • ELBOW ARTHROSCOPY     • GALLBLADDER SURGERY     • JOINT REPLACEMENT     • PARTIAL HYSTERECTOMY     • REPLACEMENT TOTAL KNEE     • TONSILLECTOMY         Family History   Problem Relation Age of Onset   • Stroke Mother    • Arthritis Mother    • Alzheimer's disease Father    • Cancer Brother        Social History     Socioeconomic History   • Marital status:      Spouse name: Not on file   • Number of children: Not on file   • Years of education: Not on file   • Highest education level: Not on file   Tobacco Use   • Smoking status: Never Smoker   • Smokeless tobacco: Never Used   Substance and Sexual Activity   • Alcohol use:  "Yes     Comment: social drink   • Drug use: No   • Sexual activity: Not Currently     Partners: Male     Birth control/protection: Abstinence       Exam:    Vitals:    11/18/19 1310   Temp: 97.9 °F (36.6 °C)   TempSrc: Temporal   Weight: 83.9 kg (185 lb)   Height: 170.2 cm (67\")     Contour of shoulder appears normal.  Skin intact and benign.  Arm and forearm soft.  Motion is better but not full.  Forward elevation is about 140 degrees, external rotation about 45 degrees and internal rotation to her low back.  She is weak with abduction and elevation.  Good motor and sensory function distally.  Palpable pulse with good cap refill.      Imaging  2v xrays including AP, scapular Y are ordered and reviewed by me to evaluate alignment and for comparison purposes.  I cannot tell if things are healing or not.  The fracture line is still visible.  There is some callus.    Impression:  3 months s/p closed treatment left proximal humerus fracture    Plan:    1.  Progress ROM and strengthening as tolerated.  2.  Continue PT.  3.  No restrictions at this point.  4.  Follow up in 6 weeks to make sure that this is healing properly.  "

## 2019-11-21 ENCOUNTER — TREATMENT (OUTPATIENT)
Dept: PHYSICAL THERAPY | Facility: CLINIC | Age: 84
End: 2019-11-21

## 2019-11-21 DIAGNOSIS — M25.512 LEFT SHOULDER PAIN, UNSPECIFIED CHRONICITY: Primary | ICD-10-CM

## 2019-11-21 DIAGNOSIS — R29.898 SHOULDER WEAKNESS: ICD-10-CM

## 2019-11-21 DIAGNOSIS — R68.89 IMPAIRED FUNCTION OF UPPER EXTREMITY: ICD-10-CM

## 2019-11-21 PROCEDURE — 97110 THERAPEUTIC EXERCISES: CPT | Performed by: PHYSICAL THERAPIST

## 2019-11-21 PROCEDURE — 97530 THERAPEUTIC ACTIVITIES: CPT | Performed by: PHYSICAL THERAPIST

## 2019-11-21 NOTE — PROGRESS NOTES
Physical Therapy Daily Progress Note  Visit: 10    Nenita Tavares reports: she is able to use her arm much better for daily activities such as fixing her hair, cooking, household chores. Reports she followed up with  and her shoulder is still healing. She has requested to continue with her HEP at this time due to she has a busy week next week (Thasgivingand it is difficult for her to make appointments.     Subjective     Objective   See Exercise, Manual, and Modality Logs for complete treatment. AROM R shoulder flexion: 140, Abd 90, ER: 45 ; IR to low back. HEP reviewed with patient.       Assessment/Plan AROM progressing well. Hold PT for now per pt request.     Manual Therapy:          mins  66149;  Therapeutic Exercise:       20   mins  34586;     Neuromuscular Stephan:         mins  81900;    Therapeutic Activity:     10      mins  27940;     Gait Training:            mins  23117;     Ultrasound:           mins  03301;    Electrical Stimulation:          mins  34293 ( );  Dry Needling           mins self-pay  Traction           mins 07996  Canalith Repositioning         mins 04933      Timed Treatment:  30    mins   Total Treatment:     40   mins    Jessica Jonas, DAWNA  [place] License #: [id number]    Physical Therapist

## 2019-11-25 NOTE — PROGRESS NOTES
Physical Therapy Daily Progress Note        Nenita Tavares reports: left shoulder pain continues.    Subjective     Objective   See Exercise, Manual, and Modality Logs for complete treatment.     Manual therapy x 15 min: PROM left shoulder/UE all planes with gentle available end range overpressure;  PROM stretching left shoulder/UE all planes with prolonged static stretch/hold in available range; joint mobs/oscilliations/mobs(gr 2) L.      Assessment/Plan  Manual interventions beneficial this session to pt in regards to left shoulder mobility.  Subjective complaints of left shoulder pain remain elevated but patient able to relax with joint distractions and oscillations.  Good performance this session with exercise regimen, benefited from encouragement to perform in pain free range as able.    Progress per Plan of Care toward all goals as symptoms allow.           Manual Therapy:    15    mins  19330;  Therapeutic Exercise:         mins  90494;     Neuromuscular Stephan:       mins  70575;    Therapeutic Activity:          mins  04226;     Gait Training:           mins  07762;     Ultrasound:       mins  04748;    Electrical Stimulation:         mins  90711 ( );      Timed Treatment:   15   mins   Total Treatment:   15   mins    Kong Zepeda PTA    Physical Therapist Assistant [place] [id number]

## 2019-11-25 NOTE — PROGRESS NOTES
Physical Therapy Daily Progress Note        Nenita Tavares reports: left sh/UE still painful with certain movements/positions.    Subjective     Objective   See Exercise, Manual, and Modality Logs for complete treatment.   -discussed addition of e stim post exercise regimen for pain control.    Manual therapy x 15 min: PROM left shoulder/UE all planes with gentle available end range overpressure;  PROM stretching left shoulder/UE all planes with prolonged static stretch/hold in available range; joint mobs/oscilliations/mobs(gr 2) L.    Therapeutic exercise L UE consisting of:  Seated left elbow flex/ext, seated cane assisted ROM ER L UE, seated 3 way pulleys(flex, scap, abd) 2 min each direction, standing blue exercise ball fwd flex, L UE pendulums, post sh rolls, scap retraction    Ice left shoulder post exercise x 10 min.      Assessment/Plan  Subjective complaints of pain left sh/UE remain increased.  Noted mm tightness and guarding, due to pain with manual intervention.  Capsular mobility is good but ROM is limited due to pain/guarding.  Able to perform therapeutic exercise regimen, but benefits from verbal/tactile cues to ensure correct exercise technique and performance.    Progress per Plan of Care toward all goals           Manual Therapy:    15     mins  06774;  Therapeutic Exercise:    20     mins  80226;     Neuromuscular Stephan:        mins  66676;    Therapeutic Activity:   6     mins  45599;     Gait Training:          mins  91140;     Ultrasound:       mins  24485;    Electrical Stimulation:       mins  20909 ( );      Timed Treatment:   41   mins   Total Treatment:    51  mins    Kong Zepeda PTA    Physical Therapist Assistant [place] [id number]

## 2020-01-06 ENCOUNTER — OFFICE VISIT (OUTPATIENT)
Dept: ORTHOPEDIC SURGERY | Facility: CLINIC | Age: 85
End: 2020-01-06

## 2020-01-06 VITALS — WEIGHT: 185 LBS | TEMPERATURE: 97.4 F | BODY MASS INDEX: 29.03 KG/M2 | HEIGHT: 67 IN

## 2020-01-06 DIAGNOSIS — S42.90XA CLOSED FRACTURE OF SHOULDER, UNSPECIFIED LATERALITY, INITIAL ENCOUNTER: Primary | ICD-10-CM

## 2020-01-06 PROCEDURE — 99212 OFFICE O/P EST SF 10 MIN: CPT | Performed by: ORTHOPAEDIC SURGERY

## 2020-01-06 PROCEDURE — 73030 X-RAY EXAM OF SHOULDER: CPT | Performed by: ORTHOPAEDIC SURGERY

## 2020-01-06 NOTE — PROGRESS NOTES
Chief complaint: Follow-up regarding left proximal humerus fracture     Ms. Tavares follows up today for her left proximal humerus fracture.  She still has some soreness.  She is able to do most of her routine daily activities.  She says her motion is improving.  She wants to go back to working out at [employer].    Left shoulder is examined.  Skin is benign.  Mild tenderness.  No obvious gross motion at the fracture site.  Her motion is about the same as last visit.  She remains weak with abduction and elevation.    AP and scapular Y views of the left shoulder are ordered, reviewed, and compared to her previous x-rays.  The fracture line remains visible.  No significant interval callus formation.    Assessment: 4-month status post closed treatment of left proximal humerus fracture with delayed union.    Plan:  If she fails to improve, we may have to consider surgical options for her down the road.  For now, I am going to give it a bit more time.  I will see her back in 3 months.  I think it is fine for her to resume working out at [employer].

## 2020-01-29 ENCOUNTER — OFFICE VISIT (OUTPATIENT)
Dept: FAMILY MEDICINE CLINIC | Facility: CLINIC | Age: 85
End: 2020-01-29

## 2020-01-29 ENCOUNTER — HOSPITAL ENCOUNTER (OUTPATIENT)
Dept: GENERAL RADIOLOGY | Facility: HOSPITAL | Age: 85
Discharge: HOME | End: 2020-01-29
Admitting: FAMILY MEDICINE

## 2020-01-29 VITALS
SYSTOLIC BLOOD PRESSURE: 122 MMHG | OXYGEN SATURATION: 97 % | HEIGHT: 67 IN | BODY MASS INDEX: 28.31 KG/M2 | HEART RATE: 87 BPM | WEIGHT: 180.4 LBS | TEMPERATURE: 98.3 F | DIASTOLIC BLOOD PRESSURE: 68 MMHG

## 2020-01-29 DIAGNOSIS — J18.9 PNEUMONIA OF LEFT LOWER LOBE DUE TO INFECTIOUS ORGANISM: Primary | ICD-10-CM

## 2020-01-29 PROCEDURE — 99214 OFFICE O/P EST MOD 30 MIN: CPT | Performed by: FAMILY MEDICINE

## 2020-01-29 PROCEDURE — 71046 X-RAY EXAM CHEST 2 VIEWS: CPT

## 2020-01-29 RX ORDER — AZITHROMYCIN 250 MG/1
TABLET, FILM COATED ORAL
Qty: 6 TABLET | Refills: 0 | Status: SHIPPED | OUTPATIENT
Start: 2020-01-29 | End: 2020-03-02

## 2020-01-29 RX ORDER — AMOXICILLIN 500 MG/1
1000 CAPSULE ORAL 2 TIMES DAILY
Qty: 20 CAPSULE | Refills: 0 | Status: SHIPPED | OUTPATIENT
Start: 2020-01-29 | End: 2020-03-02 | Stop reason: SDUPTHER

## 2020-01-29 NOTE — PROGRESS NOTES
"Subjective   Nenita Tavares is a 88 y.o. female.     Chief Complaint   Patient presents with   • Cough     x 3 weeks  has not been eating    • Fatigue   • URI   • Dizziness        History of Present Illness    Coughing for 3 weeks.  Feeling worse.  Feeling rundown poor appetite.  She feels the same way she did a few years ago with pneumonia.  The cough is getting more off.  She is had no fevers or chills.  Maybe little short of air.  No wheezing.  Moderate but persistent symptoms.  No sick contacts.  No pleurisy symptoms.  Pneumococcal vaccination up-to-date.  Associated with some fatigue and dizziness.  Worse with activity.      The following portions of the patient's history were reviewed and updated as appropriate: allergies, current medications, past family history, past medical history, past social history, past surgical history and problem list.          Review of Systems   Constitutional: Positive for fatigue. Negative for chills and fever.   HENT: Positive for congestion.    Respiratory: Positive for cough.    Gastrointestinal:        Chronic diarrhea unchanged   Musculoskeletal: Negative.    Skin: Negative for rash.   Psychiatric/Behavioral: Negative.        Objective   Blood pressure 122/68, pulse 87, temperature 98.3 °F (36.8 °C), temperature source Oral, height 170.2 cm (67.01\"), weight 81.8 kg (180 lb 6.4 oz), SpO2 97 %.  Physical Exam   Constitutional: No distress.   No acute distress.  Nontoxic.   HENT:   Right Ear: Tympanic membrane, external ear and ear canal normal.   Left Ear: Tympanic membrane, external ear and ear canal normal.   Nose: Nose normal.   Mouth/Throat: Oropharynx is clear and moist. No oropharyngeal exudate.   Eyes: Conjunctivae are normal. Right eye exhibits no discharge. Left eye exhibits no discharge. No scleral icterus.   Cardiovascular: Normal rate.   Pulmonary/Chest: Effort normal. No stridor. No respiratory distress. She has no wheezes. She has rales.   No tachypnea.  Faint " inspiratory rales left base.  Limited and focused point of care ultrasound reveals a small pleural effusion at the left base, about 1 or 2 cm thick.  There is questionable hepatic visitation and consolidation of the lung parenchyma.  Numerous B-lines noted.  Questionable air bronchograms.   Lymphadenopathy:     She has no cervical adenopathy.   Skin: No rash noted.   Nursing note and vitals reviewed.      Assessment/Plan   Nenita was seen today for cough, fatigue, uri and dizziness.    Diagnoses and all orders for this visit:    Pneumonia of left lower lobe due to infectious organism (CMS/[facility])  -     XR Chest PA & Lateral    Other orders  -     amoxicillin (AMOXIL) 500 MG capsule; Take 2 capsules by mouth 2 (Two) Times a Day. For 5 days  -     azithromycin (ZITHROMAX Z-JHON) 250 MG tablet; Take 2 tablets the first day, then 1 tablet daily for 4 days.      Left lower lobe pneumonia.  New diagnosis.  Prognosis uncertain, but likely good.. needs further investigation with chest x-ray.  Recommending high-dose amoxicillin for 5 days along with a Z-Jhon.  Rest.  Fluids.  With severe symptoms go directly to the emergency room such as severe shortness of breath severe chills high fever or other concerning symptoms.  I am getting a chest x-ray to help confirm diagnosis.  But clinically at this point I believe she has pneumonia and benefits of treatment outweigh risks.

## 2020-01-29 NOTE — PROGRESS NOTES
The chest x-ray did show the small pleural effusion, but no definite pneumonia.  That said I do want her to take the antibiotics as directed.  I am still concerned about early pneumonia developing.

## 2020-03-02 ENCOUNTER — HOSPITAL ENCOUNTER (OUTPATIENT)
Dept: GENERAL RADIOLOGY | Facility: HOSPITAL | Age: 85
Discharge: HOME | End: 2020-03-02
Admitting: FAMILY MEDICINE

## 2020-03-02 ENCOUNTER — OFFICE VISIT (OUTPATIENT)
Dept: FAMILY MEDICINE CLINIC | Facility: CLINIC | Age: 85
End: 2020-03-02

## 2020-03-02 VITALS
SYSTOLIC BLOOD PRESSURE: 148 MMHG | WEIGHT: 178 LBS | HEART RATE: 82 BPM | BODY MASS INDEX: 27.94 KG/M2 | TEMPERATURE: 99.3 F | DIASTOLIC BLOOD PRESSURE: 70 MMHG | OXYGEN SATURATION: 98 % | HEIGHT: 67 IN

## 2020-03-02 DIAGNOSIS — J40 BRONCHITIS: Primary | ICD-10-CM

## 2020-03-02 PROCEDURE — 99213 OFFICE O/P EST LOW 20 MIN: CPT | Performed by: FAMILY MEDICINE

## 2020-03-02 PROCEDURE — 71046 X-RAY EXAM CHEST 2 VIEWS: CPT

## 2020-03-02 RX ORDER — AMOXICILLIN 500 MG/1
1000 CAPSULE ORAL 2 TIMES DAILY
Qty: 8 CAPSULE | Refills: 0 | Status: SHIPPED | OUTPATIENT
Start: 2020-03-02 | End: 2020-08-19

## 2020-03-02 NOTE — PROGRESS NOTES
"Subjective   Nenita Tavares is a 88 y.o. female.     Chief Complaint   Patient presents with   • Cough     x 8 wk   • URI        History of Present Illness    Coughing on and off for about 8 weeks.  She felt better now over the last week is been coughing again.  This weekend she did not feel well.  She had a few extra amoxicillin tablets left.  She started taking 500 mg 2 tablets twice a day for the last 2 and half days.  States she is feeling better now.  Overall though she feels good.  The cough is the biggest problem.  No wheezing.  No shortness of breath.  No fevers.  No chills.  No shakes.  She had a chest x-ray done about 7 or 8 weeks ago which demonstrated a small pleural effusion.  Otherwise was normal.  I treat her at that time for community-acquired pneumonia.  Her  has been sick with URI syndromes.  This patient has no history of COPD or asthma.      The following portions of the patient's history were reviewed and updated as appropriate: allergies, current medications, past family history, past medical history, past social history, past surgical history and problem list.          Review of Systems   Constitutional: Negative for chills, diaphoresis, fatigue and fever.   Respiratory: Positive for cough.    Gastrointestinal: Negative.    Skin: Negative for rash.       Objective   Blood pressure 148/70, pulse 82, temperature 99.3 °F (37.4 °C), temperature source Oral, height 170.2 cm (67.01\"), weight 80.7 kg (178 lb), SpO2 98 %.  Physical Exam   Constitutional: No distress.   No acute distress.  Nontoxic.   HENT:   Right Ear: Tympanic membrane, external ear and ear canal normal.   Left Ear: Tympanic membrane, external ear and ear canal normal.   Nose: Nose normal.   Mouth/Throat: Oropharynx is clear and moist. No oropharyngeal exudate.   Eyes: Conjunctivae are normal. Right eye exhibits no discharge. Left eye exhibits no discharge. No scleral icterus.   Cardiovascular: Normal rate.   Pulmonary/Chest: " Effort normal and breath sounds normal. No stridor. No respiratory distress. She has no wheezes. She has no rales.   No tachypnea   Lymphadenopathy:     She has no cervical adenopathy.   Skin: No rash noted.   Nursing note and vitals reviewed.      Assessment/Plan   Nenita was seen today for cough and uri.    Diagnoses and all orders for this visit:    Bronchitis  -     XR Chest PA & Lateral    Other orders  -     amoxicillin (AMOXIL) 500 MG capsule; Take 2 capsules by mouth 2 (Two) Times a Day. For two days, to complete current amoxicillin treatment        Bronchitis.  Possible recent pneumonia.  Checking chest x-ray.  Giving her 2 more days of the high-dose amoxicillin.  She has taken 3.  Otherwise she has no systemic symptoms and looks good.  She can continue her over-the-counter remedies.  Including TheraFlu.  If she is not improving later in the week she is going to let us know.  With severe symptoms let us know sooner.  She is having no diarrhea.

## 2020-08-19 ENCOUNTER — OFFICE VISIT (OUTPATIENT)
Dept: FAMILY MEDICINE CLINIC | Facility: CLINIC | Age: 85
End: 2020-08-19

## 2020-08-19 DIAGNOSIS — J40 BRONCHITIS: Primary | ICD-10-CM

## 2020-08-19 PROCEDURE — 99442 PR PHYS/QHP TELEPHONE EVALUATION 11-20 MIN: CPT | Performed by: FAMILY MEDICINE

## 2020-08-19 RX ORDER — AMOXICILLIN 500 MG/1
500 CAPSULE ORAL 3 TIMES DAILY
Qty: 21 CAPSULE | Refills: 0 | Status: SHIPPED | OUTPATIENT
Start: 2020-08-19 | End: 2020-12-09

## 2020-08-19 NOTE — PROGRESS NOTES
Subjective   Nenita Tavares is a 89 y.o. female.     Chief Complaint   Patient presents with   • Cough        History of Present Illness    You have chosen to receive care through a telephone visit. Do you consent to use a telephone visit for your medical care today? Yes    Patient complains of 1 month of cough.  Intermittent.  Sometimes gets severe.  Signs awake her up at night and she like to go to the living room and sit for a few minutes.  She does not feel short of breath.  She is had no fever.  No chills.  She has had some sinus draining.  She took some leftover amoxicillin a number of days ago and the symptoms got better for a few days and then came back.    [place]  She was wearing a mask.  There was not a large crowd.  The coughing started sometime after that.        The following portions of the patient's history were reviewed and updated as appropriate: allergies, current medications, past family history, past medical history, past social history, past surgical history and problem list.          Review of Systems   Constitutional: Negative.    HENT: Positive for rhinorrhea.    Respiratory: Positive for cough. Negative for shortness of breath.        Objective   There were no vitals taken for this visit.  Physical Exam   Constitutional:   The patient is well-known to me.  She does not sound in acute distress.  Her breathing sounds normal.       Assessment/Plan   Nenita was seen today for cough.    Diagnoses and all orders for this visit:    Bronchitis    Bronchitis syndrome.  Improved with amoxicillin.  Possible sinusitis with postnasal drip.  I prescribed amoxicillin 500 mg a day for 5 to 7 days.  If not improving soon she is going to let us know.  May need further evaluation.  At this time no indication for COVID-19 testing.    Total duration of service 12 minutes

## 2020-10-07 ENCOUNTER — TELEMEDICINE (OUTPATIENT)
Dept: FAMILY MEDICINE CLINIC | Facility: CLINIC | Age: 85
End: 2020-10-07

## 2020-10-07 ENCOUNTER — TELEPHONE (OUTPATIENT)
Dept: FAMILY MEDICINE CLINIC | Facility: CLINIC | Age: 85
End: 2020-10-07

## 2020-10-07 DIAGNOSIS — J40 BRONCHITIS: Primary | ICD-10-CM

## 2020-10-07 PROCEDURE — 99442 PR PHYS/QHP TELEPHONE EVALUATION 11-20 MIN: CPT | Performed by: FAMILY MEDICINE

## 2020-10-07 RX ORDER — PREDNISONE 10 MG/1
TABLET ORAL
Qty: 15 TABLET | Refills: 0 | Status: SHIPPED | OUTPATIENT
Start: 2020-10-07 | End: 2020-10-17

## 2020-10-07 RX ORDER — ALBUTEROL SULFATE 90 UG/1
2 AEROSOL, METERED RESPIRATORY (INHALATION) EVERY 4 HOURS PRN
Qty: 18 G | Refills: 0 | Status: SHIPPED | OUTPATIENT
Start: 2020-10-07 | End: 2021-05-11

## 2020-10-07 NOTE — PROGRESS NOTES
Subjective   Nenita Tavares is a 89 y.o. female.     Chief Complaint   Patient presents with   • Cough        History of Present Illness     You have chosen to receive care through a telephone visit. Do you consent to use a telephone visit for your medical care today? Yes      Cough symptoms.  1 month.  Intermittent.  Comes and goes for no reason.  Possible allergies.  She went to urgent care about 3 weeks ago.  Was given prednisone 20 mg day for 5 days she states that worked great until the prednisone was off and then she had the coughing again.  He has no wheezing.  No tightness.  No shortness of breath.  No fevers.  No chills.  No upper URI symptoms otherwise.  No sinusitis.  No sore throat.  No change in sense of taste or smell.  She has no history of asthma.  Non-smoker.  She is not exposed to a lot of dust.  She is mostly staying home.  No ACE inhibitor use.      The following portions of the patient's history were reviewed and updated as appropriate: allergies, current medications, past family history, past medical history, past social history, past surgical history and problem list.          Review of Systems   Constitutional: Negative for fever.   HENT: Negative for congestion.    Respiratory: Positive for cough. Negative for shortness of breath and wheezing.        Objective   There were no vitals taken for this visit.  Physical Exam  Constitutional:       Comments: Patient is well-known to me.  She sounds in no acute distress.         Assessment/Plan   Nenita was seen today for cough.    Diagnoses and all orders for this visit:    Bronchitis    Other orders  -     predniSONE (DELTASONE) 10 MG tablet; Take 2 tablets by mouth Daily for 5 days, THEN 1 tablet Daily for 5 days.  -     albuterol sulfate  (90 Base) MCG/ACT inhaler; Inhale 2 puffs Every 4 (Four) Hours As Needed for Wheezing.      Bronchitis.  Suspicious for allergic cause.  Prednisone 20 mg day for 5 days and 10 mg day for 5 days.  Albuterol  inhaler as needed.  At this time no need for antibiotics.  Patient understands to contact us or seek medical attention with shortness of breath, fever, or other worsening symptoms.  If not improved in a couple weeks she also needs to let us know.  She is aware of this.    Duration of visit 15 minutes

## 2020-10-07 NOTE — TELEPHONE ENCOUNTER
PTS  YOLI IS CALLING IN STATING THAT PT HAS HAD A COUGH FOR AWHILE NOW. PT HAS GONE TO URGENT CARE FOR THIS AND WAS PRESCRIBED MEDICATION WHICH WORKED FOR A MOMENT BUT  NOW THE COUGH IS BACK.  PT IS REQUESTING A CALL BACK FROM DR EMMANUEL TO DISCUSS THIS MATTER.      YOLI CALL BACK  [phone] OK TO LEAVE A VOICE MAIL.    PHARMACY  [facility]  [address]

## 2020-12-09 ENCOUNTER — TELEPHONE (OUTPATIENT)
Dept: FAMILY MEDICINE CLINIC | Facility: CLINIC | Age: 85
End: 2020-12-09

## 2020-12-09 RX ORDER — DEXAMETHASONE 4 MG/1
2 TABLET ORAL
Qty: 12 G | Refills: 0 | Status: SHIPPED | OUTPATIENT
Start: 2020-12-09 | End: 2020-12-14

## 2020-12-09 NOTE — TELEPHONE ENCOUNTER
Patient spouse called in requesting a call back from the doctor regarding the patient.    Best call back # [phone]

## 2020-12-14 ENCOUNTER — OFFICE VISIT (OUTPATIENT)
Dept: FAMILY MEDICINE CLINIC | Facility: CLINIC | Age: 85
End: 2020-12-14

## 2020-12-14 VITALS
DIASTOLIC BLOOD PRESSURE: 69 MMHG | HEART RATE: 95 BPM | TEMPERATURE: 97.7 F | HEIGHT: 68 IN | SYSTOLIC BLOOD PRESSURE: 127 MMHG | OXYGEN SATURATION: 97 % | WEIGHT: 173.9 LBS | BODY MASS INDEX: 26.36 KG/M2

## 2020-12-14 DIAGNOSIS — F32.1 CURRENT MODERATE EPISODE OF MAJOR DEPRESSIVE DISORDER WITHOUT PRIOR EPISODE (HCC): ICD-10-CM

## 2020-12-14 DIAGNOSIS — R05.3 CHRONIC COUGH: Primary | ICD-10-CM

## 2020-12-14 DIAGNOSIS — R73.09 ELEVATED GLUCOSE: ICD-10-CM

## 2020-12-14 PROCEDURE — 99214 OFFICE O/P EST MOD 30 MIN: CPT | Performed by: FAMILY MEDICINE

## 2020-12-14 RX ORDER — FLUTICASONE PROPIONATE 50 MCG
2 SPRAY, SUSPENSION (ML) NASAL DAILY
Qty: 16 G | Refills: 6 | Status: SHIPPED | OUTPATIENT
Start: 2020-12-14

## 2020-12-14 RX ORDER — PREDNISONE 10 MG/1
TABLET ORAL
Qty: 21 TABLET | Refills: 0 | Status: SHIPPED | OUTPATIENT
Start: 2020-12-14 | End: 2020-12-28

## 2020-12-14 NOTE — PROGRESS NOTES
"Subjective  Answers for HPI/ROS submitted by the patient on 12/10/2020   Cough  What is the primary reason for your visit?: Cough    Nenita Tavares is a 89 y.o. female.     Chief Complaint   Patient presents with   • Cough     follow up         Cough  This is a recurrent problem. The current episode started more than 1 year ago. The problem has been waxing and waning. The problem occurs every few hours. The cough is non-productive and productive of sputum. Associated symptoms include rhinorrhea. Pertinent negatives include no chest pain, chills, ear congestion, ear pain, fever, heartburn, hemoptysis, myalgias, nasal congestion, postnasal drip, rash, sore throat, shortness of breath, sweats, weight loss or wheezing. The symptoms are aggravated by pollens.         Exacerbation of chronic cough.  She has had on and off for a number of years she states.  I saw her earlier this year with pneumonia.  She did not have much trouble over the summer.  Then in October she was seen at the urgent care center for URI syndrome.  They did not check for COVID-19.  Then about 6 weeks later her  got sick with COVID-19 and was hospitalized.  However when her  was sick, she states she felt fine other than the occasional cough.  The last couple weeks the cough has gotten worse.  In the past she had a quick taper of prednisone that helped the cough.  She states nothing else has helped.  She is started on Flovent last week.  She states she stopped it because it \"gives me gas\".  She does not use the albuterol.  She has a lot of sinus drainage.  She is on no allergy medication or nasal sprays.    Her son  in May of this year, on her birthday.  She is feeling very sad and depressed.  She has decreased interest in pleasurable activities.  She is has her ongoing trouble with sleep.  She is taking melatonin.  She is on nortriptyline 20 mg a day prescribed by her gastroenterologist for a number of years.  She states she has been " "on depression medication in the past it is not helped.  She continues to grieve over the death of her son.    The following portions of the patient's history were reviewed and updated as appropriate: allergies, current medications, past family history, past medical history, past social history, past surgical history and problem list.          Review of Systems   Constitutional: Negative for chills, fever and weight loss.   HENT: Positive for rhinorrhea. Negative for ear pain, postnasal drip and sore throat.    Respiratory: Positive for cough. Negative for hemoptysis, shortness of breath and wheezing.    Cardiovascular: Negative for chest pain.   Gastrointestinal: Negative for heartburn.   Musculoskeletal: Negative for myalgias.   Skin: Negative for rash.   Psychiatric/Behavioral: Positive for dysphoric mood and sleep disturbance. Negative for suicidal ideas. The patient is nervous/anxious.        Objective   Blood pressure 127/69, pulse 95, temperature 97.7 °F (36.5 °C), temperature source Temporal, height 171.5 cm (67.52\"), weight 78.9 kg (173 lb 14.4 oz), SpO2 97 %.  Physical Exam  Constitutional:       Comments: Patient appears tired but nontoxic   HENT:      Head: Atraumatic.      Nose: Rhinorrhea present.   Eyes:      Conjunctiva/sclera: Conjunctivae normal.   Neck:      Musculoskeletal: Normal range of motion.   Cardiovascular:      Rate and Rhythm: Normal rate and regular rhythm.      Pulses: Normal pulses.   Pulmonary:      Effort: Pulmonary effort is normal. No respiratory distress.      Breath sounds: Normal breath sounds. No stridor. No wheezing, rhonchi or rales.   Skin:     General: Skin is warm and dry.      Coloration: Skin is not pale.   Psychiatric:      Comments: Mood is depressed         Assessment/Plan   Diagnoses and all orders for this visit:    1. Chronic cough (Primary)    2. Elevated glucose  -     Comprehensive Metabolic Panel  -     Hemoglobin A1c    3. 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  Dispense: 16 g; Refill: 6  -     predniSONE (DELTASONE) 10 MG tablet; Take 2 tablets by mouth Daily for 7 days, THEN 1 tablet Daily for 7 days.  Dispense: 21 tablet; Refill: 0      Exacerbation of chronic cough.  Suspicious for possible allergic rhinitis and postnasal drip.  Normal follow-up x-ray earlier this year.  She is not having any wheezing.  The inhalers do not seem to be helping her.  This may also be exacerbated by her depression and anxiety.  Her  had COVID-19, but she had no symptoms during her 's illness.  At this time I am recommending a 2-week prednisone taper which is helped in the past.  20 mg a day for 7 days then 10 mg day for 7 days.  Also adding Flonase nasal spray.  I did offer to refer her to a pulmonologist and she declined.    Major depression.  Possibly recurrent.  Moderate episodes.  I do recommend an SSRI antidepressant.  Patient declined.  She states has been on depression medication in the past and has not helped.  Counseling given.  Patient understands she can return anytime for further discussion if she wishes.    Elevated glucose level last year.  Normal creatinine.  Likely not undiagnosed diabetes but I am checking an A1c and CMP today.  Before the prednisone is started.  Follow-up as scheduled.

## 2020-12-15 LAB
ALBUMIN SERPL-MCNC: 4.1 G/DL (ref 3.6–4.6)
ALBUMIN/GLOB SERPL: 1.7 {RATIO} (ref 1.2–2.2)
ALP SERPL-CCNC: 83 IU/L (ref 39–117)
ALT SERPL-CCNC: 11 IU/L (ref 0–32)
AST SERPL-CCNC: 16 IU/L (ref 0–40)
BILIRUB SERPL-MCNC: 0.6 MG/DL (ref 0–1.2)
BUN SERPL-MCNC: 17 MG/DL (ref 8–27)
BUN/CREAT SERPL: 19 (ref 12–28)
CALCIUM SERPL-MCNC: 9.5 MG/DL (ref 8.7–10.3)
CHLORIDE SERPL-SCNC: 103 MMOL/L (ref 96–106)
CO2 SERPL-SCNC: 24 MMOL/L (ref 20–29)
CREAT SERPL-MCNC: 0.89 MG/DL (ref 0.57–1)
GLOBULIN SER CALC-MCNC: 2.4 G/DL (ref 1.5–4.5)
GLUCOSE SERPL-MCNC: 128 MG/DL (ref 65–99)
HBA1C MFR BLD: 5.9 % (ref 4.8–5.6)
POTASSIUM SERPL-SCNC: 3.7 MMOL/L (ref 3.5–5.2)
PROT SERPL-MCNC: 6.5 G/DL (ref 6–8.5)
SODIUM SERPL-SCNC: 140 MMOL/L (ref 134–144)

## 2020-12-15 NOTE — PROGRESS NOTES
The nonfasting glucose is just slightly elevated.  The A1c average glucose test is not in the diabetic range.  At this time no evidence of diabetes.  Possible early prediabetes.

## 2021-01-11 RX ORDER — NORTRIPTYLINE HYDROCHLORIDE 10 MG/1
CAPSULE ORAL
Qty: 60 CAPSULE | Refills: 1 | Status: SHIPPED | OUTPATIENT
Start: 2021-01-11 | End: 2021-02-19 | Stop reason: SDUPTHER

## 2021-01-11 NOTE — TELEPHONE ENCOUNTER
Patient called, stated that she needed a refill on her nortriptyline 10mg 2 PO at bedtime. Patient is scheduled for a yearly f/u peterson Rivera in  Feb. Pharmacy verified, records in box. Let me know if anything additional is needed.        TS

## 2021-02-09 ENCOUNTER — IMMUNIZATION (OUTPATIENT)
Dept: VACCINE CLINIC | Facility: HOSPITAL | Age: 86
End: 2021-02-09

## 2021-02-09 PROCEDURE — 0001A: CPT | Performed by: INTERNAL MEDICINE

## 2021-02-09 PROCEDURE — 91300 HC SARSCOV02 VAC 30MCG/0.3ML IM: CPT | Performed by: INTERNAL MEDICINE

## 2021-02-19 ENCOUNTER — OFFICE VISIT (OUTPATIENT)
Dept: GASTROENTEROLOGY | Facility: CLINIC | Age: 86
End: 2021-02-19

## 2021-02-19 DIAGNOSIS — K58.0 IRRITABLE BOWEL SYNDROME WITH DIARRHEA: Chronic | ICD-10-CM

## 2021-02-19 DIAGNOSIS — K21.9 GASTROESOPHAGEAL REFLUX DISEASE, UNSPECIFIED WHETHER ESOPHAGITIS PRESENT: Primary | Chronic | ICD-10-CM

## 2021-02-19 PROCEDURE — 99442 PR PHYS/QHP TELEPHONE EVALUATION 11-20 MIN: CPT | Performed by: NURSE PRACTITIONER

## 2021-02-19 RX ORDER — NORTRIPTYLINE HYDROCHLORIDE 10 MG/1
CAPSULE ORAL
Qty: 180 CAPSULE | Refills: 3 | Status: SHIPPED | OUTPATIENT
Start: 2021-02-19 | End: 2021-05-11

## 2021-02-19 NOTE — PATIENT INSTRUCTIONS
1.  For GERD, continue omeprazole 20 mg once daily.    2.  For IBS-D, continue nortriptyline 20 mg at bedtime.  Refills have been sent to your pharmacy.    3.  Continue daily probiotic.    4.  Plan for telephone follow-up in 1 year for reassessment of symptoms and medication refills, or sooner should symptoms recur.

## 2021-02-19 NOTE — PROGRESS NOTES
Chief Complaint   Patient presents with   • Follow-up     GERD and IBS-D         History of Present Illness  89-year-old female presents today for telephone follow-up.  She was a patient in our previous practice and was last seen on 5/01/2019.  History of GERD and IBS-D.    She reports that her heartburn and reflux is well controlled on omeprazole 20 mg once daily.  Occasionally she will experience a flare of symptoms if she eats certain foods, but cannot identify what those foods are as symptoms are very sporadic.  She denies any nausea, vomiting, or dysphagia.  He does report the new onset of a cough, but has followed up with her PCP for this.    She reports that her IBS-D is under good control with continued use of nortriptyline 20 mg at bedtime.  She reports having regular bowel movements and denies having any melena or hematochezia.  She continues a daily probiotic.  She does report some weight loss over the past few months due to the unexpected loss of her son in May 2020.  She reports that she does have a good support system and is coping well.    She no longer requires colonoscopies for screening purposes.  She denies any family history of colon cancer.    You have chosen to receive care through a telephone visit.   Do you consent to use a telephone visit for your medical care today? Yes    Review of Systems   Constitutional: Positive for unexpected weight change.   HENT: Negative for trouble swallowing.    Respiratory: Positive for cough.    Cardiovascular: Negative for chest pain.   Gastrointestinal: Negative for abdominal distention, abdominal pain, anal bleeding, blood in stool, constipation, diarrhea, nausea, rectal pain and vomiting.     Result Review :      GASTROENTEROLOGY - SCAN - [facility], 05/01/2019 (05/01/2019)    Assessment and Plan    Diagnoses and all orders for this visit:    1. Gastroesophageal reflux disease, unspecified whether esophagitis present (Primary)    2.  Irritable bowel syndrome with diarrhea    Other orders  -     nortriptyline (PAMELOR) 10 MG capsule; Take 2 capsules by mouth every night at bedtime.  Dispense: 180 capsule; Refill: 3         This visit has been rescheduled as a phone visit to comply with patient safety concerns in accordance with CDC recommendations. Total time of discussion was 12 minutes.    Follow Up   Return in about 1 year (around 2/19/2022).    Patient Instructions   1.  For GERD, continue omeprazole 20 mg once daily.    2.  For IBS-D, continue nortriptyline 20 mg at bedtime.  Refills have been sent to your pharmacy.    3.  Continue daily probiotic.    4.  Plan for telephone follow-up in 1 year for reassessment of symptoms and medication refills, or sooner should symptoms recur.     Discussion:    From a GI standpoint patient is doing very well with management of GERD on omeprazole 20 mg once daily and nortriptyline 20 mg at bedtime for management of IBS-D.  We will continue this current regimen and have provided her with refills of the nortriptyline.  We will plan for a follow-up visit in 1 year for reassessment of symptoms and medication refills, or sooner should symptoms worsen or recur.  Patient verbalized understand of above plan of care and is in agreement.  All questions answered and support provided.

## 2021-03-02 ENCOUNTER — IMMUNIZATION (OUTPATIENT)
Dept: VACCINE CLINIC | Facility: HOSPITAL | Age: 86
End: 2021-03-02

## 2021-03-02 PROCEDURE — 91300 HC SARSCOV02 VAC 30MCG/0.3ML IM: CPT | Performed by: INTERNAL MEDICINE

## 2021-03-02 PROCEDURE — 0002A: CPT | Performed by: INTERNAL MEDICINE

## 2021-03-30 ENCOUNTER — OFFICE VISIT (OUTPATIENT)
Dept: FAMILY MEDICINE CLINIC | Facility: CLINIC | Age: 86
End: 2021-03-30

## 2021-03-30 VITALS
BODY MASS INDEX: 25.05 KG/M2 | OXYGEN SATURATION: 98 % | SYSTOLIC BLOOD PRESSURE: 141 MMHG | TEMPERATURE: 97.5 F | HEART RATE: 82 BPM | HEIGHT: 68 IN | WEIGHT: 165.3 LBS | DIASTOLIC BLOOD PRESSURE: 75 MMHG

## 2021-03-30 DIAGNOSIS — F32.1 CURRENT MODERATE EPISODE OF MAJOR DEPRESSIVE DISORDER WITHOUT PRIOR EPISODE (HCC): ICD-10-CM

## 2021-03-30 DIAGNOSIS — R05.3 CHRONIC COUGH: Primary | ICD-10-CM

## 2021-03-30 PROCEDURE — 99214 OFFICE O/P EST MOD 30 MIN: CPT | Performed by: FAMILY MEDICINE

## 2021-03-30 RX ORDER — ESCITALOPRAM OXALATE 10 MG/1
10 TABLET ORAL DAILY
Qty: 90 TABLET | Refills: 1 | Status: SHIPPED | OUTPATIENT
Start: 2021-03-30 | End: 2022-07-22

## 2021-03-30 NOTE — PROGRESS NOTES
"Answers for HPI/ROS submitted by the patient on 3/30/2021  What is the primary reason for your visit?: Cough    Chief Complaint  Cough (x 3 mo ) and Depression    Subjective          Nenita Tavares presents to [facility]  History of Present Illness     Follow-up chronic cough and depression.  At her last visit this is the same issue.  I would recommend a pulmonary consultation the patient declined.  I also recommend depression medicine but the patient declined.  She is suffering from many things including the death of her son in May of last year on the patient's birthday.  Also her  was hospitalized for period of time.  Her  is now home.  She describes decreased interest in pleasurable activities.  Some trouble sleeping.  She has some vague suicidal ideation.  She says she wishes that she was dead.  She has family around.  She has no specific plan.  She not been eating as much.  She is lost about 8 pounds.  She continues nortriptyline that she has been taking for a number of years prescribed by her previous gastroenterologist.  Taken for IBS.    With regard to the cough, she is a never smoker but she has had secondhand smoking.  She has not been drinking alcohol.  She does not feel winded or short of breath.  She has not had relief with albuterol in the past, but she may not have been taking it properly.  She coughs up some mucus at times.  She has had response to prednisone in the past.  She would like more prednisone today.  She has been getting some relief with over-the-counter Mucinex recently.    Social History     Tobacco Use   • Smoking status: Never Smoker   • Smokeless tobacco: Never Used   Substance Use Topics   • Alcohol use: Yes     Comment: social drink   • Drug use: No         Objective   Vital Signs:   /75   Pulse 82   Temp 97.5 °F (36.4 °C) (Temporal)   Ht 171.5 cm (67.52\")   Wt 75 kg (165 lb 4.8 oz)   SpO2 98%   BMI 25.49 kg/m²     Physical " Exam  Constitutional:       Comments: Patient looks tired.   HENT:      Head: Atraumatic.   Cardiovascular:      Rate and Rhythm: Normal rate and regular rhythm.      Pulses: Normal pulses.      Heart sounds: Normal heart sounds.   Pulmonary:      Effort: Pulmonary effort is normal.      Comments: There are some scattered faint inspiratory wheezes otherwise good air movement.  Musculoskeletal:      Cervical back: Normal range of motion and neck supple.   Lymphadenopathy:      Cervical: No cervical adenopathy.   Skin:     General: Skin is warm and dry.   Neurological:      General: No focal deficit present.   Psychiatric:      Comments: Affect is flat.  Mood is depressed.        Result Review :   The following data was reviewed by: Srini Gustafson MD on 03/30/2021:  Common labs    Common Labsle 12/14/20 12/14/20    [id number]   Glucose 128 (A)    BUN 17    Creatinine 0.89    eGFR Non  Am 58 (A)    eGFR African Am 66    Sodium 140    Potassium 3.7    Chloride 103    Calcium 9.5    Total Protein 6.5    Albumin 4.1    Total Bilirubin 0.6    Alkaline Phosphatase 83    AST (SGOT) 16    ALT (SGPT) 11    Hemoglobin A1C  5.9 (A)   (A) Abnormal value       Comments are available for some flowsheets but are not being displayed.                     Assessment and Plan    Diagnoses and all orders for this visit:    1. Chronic cough (Primary)  -     CBC & Differential  -     Comprehensive Metabolic Panel  -     TSH Rfx On Abnormal To Free T4  -     Ambulatory Referral to Pulmonology  -     CT chest hi resolution; Future    2. Current moderate episode of major depressive disorder without prior episode (CMS/HCC)  -     CBC & Differential  -     Comprehensive Metabolic Panel  -     TSH Rfx On Abnormal To Free T4    Other orders  -     escitalopram (Lexapro) 10 MG tablet; Take 1 tablet by mouth Daily.  Dispense: 90 tablet; Refill: 1  -     fluticasone-salmeterol (Advair Diskus) 250-50 MCG/DOSE DISKUS; Inhale 1 puff 2 (Two)  Times a Day.  Dispense: 60 each; Refill: 2      Chronic cough.  Suspicious for cough variant asthma.  However cannot rule out interstitial lung disease.... Although less likely given lack of dyspnea.  At this time I am recommending Advair Diskus 1 puff twice a day.  I am recommending pulmonary consultation, referral has been placed.  I am recommending albuterol as needed, she has at home.  I am recommending a CT scan, high resolution of the chest.  I will see her back in 4 to 6 weeks for recheck.  They will call with questions.    Major depression.  Moderate to severe.  I am recommending Lexapro 10 mg a day.  1/2 tablet a day for the first week.  Side effects discussed.  Keep taking the medication unless there are severe side effects.  Counseling given.      Follow Up   No follow-ups on file.  Patient was given instructions and counseling regarding her condition or for health maintenance advice. Please see specific information pulled into the AVS if appropriate.

## 2021-03-31 LAB
ALBUMIN SERPL-MCNC: 3.8 G/DL (ref 3.5–5.2)
ALBUMIN/GLOB SERPL: 1.6 G/DL
ALP SERPL-CCNC: 90 U/L (ref 39–117)
ALT SERPL-CCNC: 12 U/L (ref 1–33)
AST SERPL-CCNC: 13 U/L (ref 1–32)
BASOPHILS # BLD AUTO: 0.07 10*3/MM3 (ref 0–0.2)
BASOPHILS NFR BLD AUTO: 1 % (ref 0–1.5)
BILIRUB SERPL-MCNC: 0.4 MG/DL (ref 0–1.2)
BUN SERPL-MCNC: 12 MG/DL (ref 8–23)
BUN/CREAT SERPL: 15.8 (ref 7–25)
CALCIUM SERPL-MCNC: 9.6 MG/DL (ref 8.6–10.5)
CHLORIDE SERPL-SCNC: 102 MMOL/L (ref 98–107)
CO2 SERPL-SCNC: 28.6 MMOL/L (ref 22–29)
CREAT SERPL-MCNC: 0.76 MG/DL (ref 0.57–1)
EOSINOPHIL # BLD AUTO: 0.62 10*3/MM3 (ref 0–0.4)
EOSINOPHIL NFR BLD AUTO: 8.6 % (ref 0.3–6.2)
ERYTHROCYTE [DISTWIDTH] IN BLOOD BY AUTOMATED COUNT: 11.6 % (ref 12.3–15.4)
GLOBULIN SER CALC-MCNC: 2.4 GM/DL
GLUCOSE SERPL-MCNC: 85 MG/DL (ref 65–99)
HCT VFR BLD AUTO: 38.6 % (ref 34–46.6)
HGB BLD-MCNC: 12.9 G/DL (ref 12–15.9)
IMM GRANULOCYTES # BLD AUTO: 0.02 10*3/MM3 (ref 0–0.05)
IMM GRANULOCYTES NFR BLD AUTO: 0.3 % (ref 0–0.5)
LYMPHOCYTES # BLD AUTO: 0.75 10*3/MM3 (ref 0.7–3.1)
LYMPHOCYTES NFR BLD AUTO: 10.4 % (ref 19.6–45.3)
MCH RBC QN AUTO: 30.5 PG (ref 26.6–33)
MCHC RBC AUTO-ENTMCNC: 33.4 G/DL (ref 31.5–35.7)
MCV RBC AUTO: 91.3 FL (ref 79–97)
MONOCYTES # BLD AUTO: 0.69 10*3/MM3 (ref 0.1–0.9)
MONOCYTES NFR BLD AUTO: 9.6 % (ref 5–12)
NEUTROPHILS # BLD AUTO: 5.07 10*3/MM3 (ref 1.7–7)
NEUTROPHILS NFR BLD AUTO: 70.1 % (ref 42.7–76)
NRBC BLD AUTO-RTO: 0 /100 WBC (ref 0–0.2)
PLATELET # BLD AUTO: 386 10*3/MM3 (ref 140–450)
POTASSIUM SERPL-SCNC: 4.1 MMOL/L (ref 3.5–5.2)
PROT SERPL-MCNC: 6.2 G/DL (ref 6–8.5)
RBC # BLD AUTO: 4.23 10*6/MM3 (ref 3.77–5.28)
SODIUM SERPL-SCNC: 139 MMOL/L (ref 136–145)
TSH SERPL DL<=0.005 MIU/L-ACNC: 0.86 UIU/ML (ref 0.27–4.2)
WBC # BLD AUTO: 7.22 10*3/MM3 (ref 3.4–10.8)

## 2021-04-13 ENCOUNTER — HOSPITAL ENCOUNTER (OUTPATIENT)
Dept: CT IMAGING | Facility: HOSPITAL | Age: 86
Discharge: HOME | End: 2021-04-13
Admitting: FAMILY MEDICINE

## 2021-04-13 DIAGNOSIS — R05.3 CHRONIC COUGH: ICD-10-CM

## 2021-04-13 PROCEDURE — 71250 CT THORAX DX C-: CPT

## 2021-04-14 ENCOUNTER — TELEPHONE (OUTPATIENT)
Dept: FAMILY MEDICINE CLINIC | Facility: CLINIC | Age: 86
End: 2021-04-14

## 2021-04-14 DIAGNOSIS — R91.1 LUNG NODULE: Primary | ICD-10-CM

## 2021-04-14 NOTE — PROGRESS NOTES
I spoke with patient.  They are aware of the irregular findings on the right upper lobe of the lung.  Likely needs biopsy.  Probable CT scan guided biopsy.  However I recommend they see a thoracic surgeon first to discuss the risks and benefits of further evaluation.  She feels fine otherwise.  They have no questions for today.  They will wait for the phone call from us.  They understand to contact us if they do not hear from antibody by next week.
.Heart Palpitations    WHAT YOU NEED TO KNOW:    Heart palpitations are feelings that your heart races, jumps, throbs, or flutters. You may feel extra beats, no beats for a short time, or skipped beats. You may have these feelings in your chest, throat, or neck. They may happen when you are sitting, standing, or lying. Heart palpitations may be frightening, but are usually not caused by a serious problem.     DISCHARGE INSTRUCTIONS:    Call 911 or have someone else call for any of the following:     You have any of the following signs of a heart attack:   Squeezing, pressure, or pain in your chest      You may also have any of the following:   Discomfort or pain in your back, neck, jaw, stomach, or arm      Shortness of breath      Nausea or vomiting      Lightheadedness or a sudden cold sweat      You have any of the following signs of a stroke:   Numbness or drooping on one side of your face       Weakness in an arm or leg      Confusion or difficulty speaking      Dizziness, a severe headache, or vision loss      You faint or lose consciousness.     Return to the emergency department if:     Your palpitations happen more often or get more intense.         Contact your healthcare provider if:     You have new or worsening swelling in your feet or ankles.      You have questions or concerns about your condition or care.    Follow up with your healthcare provider as directed: You may need to follow up with a cardiologist. You may need tests to check for heart problems that cause palpitations. Write down your questions so you remember to ask them during your visits.     Keep a record: Write down when your palpitations start and stop, what you were doing when they started, and your symptoms. Keep track of what you ate or drank within a few hours of your palpitations. Include anything that seemed to help your symptoms, such as lying down or holding your breath. This record will help you and your healthcare provider learn what triggers your palpitations. Bring this record with you to your follow up visits.    Help prevent heart palpitations:     Manage stress and anxiety. Find ways to relax such as listening to music, meditating, or doing yoga. Exercise can also help decrease stress and anxiety. Talk to someone you trust about your stress or anxiety. You can also talk to a therapist.       Get plenty of sleep every night. Ask your healthcare provider how much sleep you need each night.       Do not drink caffeine or alcohol. Caffeine and alcohol can make your palpitations worse. Caffeine is found in soda, coffee, tea, chocolate, and drinks that increase your energy.       Do not smoke. Nicotine and other chemicals in cigarettes and cigars may damage your heart and blood vessels. Ask your healthcare provider for information if you currently smoke and need help to quit. E-cigarettes or smokeless tobacco still contain nicotine. Talk to your healthcare provider before you use these products.       Do not use illegal drugs. Talk to your healthcare provider if you use illegal drugs and want help to quit.          © Copyright [employer] 2019 All illustrations and images included in CareNotes are the copyrighted property of [employer] or [employer].

## 2021-04-14 NOTE — TELEPHONE ENCOUNTER
SPOKE TO PT'S  REGARDING THAT DR. EMMANUEL WILL GIVE HIM A CALL BY THE END OF THE DAY. HE EXPRESSED UNDERSTANDING.

## 2021-04-14 NOTE — TELEPHONE ENCOUNTER
Caller: Marc Tavares    Relationship: Emergency Contact    Best call back number: [phone]    What is the best time to reach you: ANY    Who are you requesting to speak with (clinical staff, provider,  specific staff member): DR. EMMANUEL    What was the call regarding: PATIENT'S  WOULD LIKE TO KNOW IF DR. EMMANUEL HAS READ HIS WIFE'S CT SCAN RESULTS, IF SO PLEASE CALL HIM TO DISCUSS.    Do you require a callback: YES

## 2021-04-21 ENCOUNTER — OFFICE VISIT (OUTPATIENT)
Dept: OTHER | Facility: HOSPITAL | Age: 86
End: 2021-04-21

## 2021-04-21 VITALS
OXYGEN SATURATION: 97 % | HEART RATE: 61 BPM | BODY MASS INDEX: 25.69 KG/M2 | TEMPERATURE: 96.5 F | RESPIRATION RATE: 18 BRPM | DIASTOLIC BLOOD PRESSURE: 79 MMHG | HEIGHT: 68 IN | SYSTOLIC BLOOD PRESSURE: 129 MMHG | WEIGHT: 169.5 LBS

## 2021-04-21 DIAGNOSIS — R05.9 COUGH: ICD-10-CM

## 2021-04-21 DIAGNOSIS — J18.1 LUNG CONSOLIDATION (HCC): Primary | ICD-10-CM

## 2021-04-21 PROCEDURE — 99203 OFFICE O/P NEW LOW 30 MIN: CPT | Performed by: THORACIC SURGERY (CARDIOTHORACIC VASCULAR SURGERY)

## 2021-04-22 DIAGNOSIS — R91.1 LUNG NODULE: Primary | ICD-10-CM

## 2021-04-30 ENCOUNTER — HOSPITAL ENCOUNTER (OUTPATIENT)
Dept: PET IMAGING | Facility: HOSPITAL | Age: 86
Discharge: HOME | End: 2021-04-30

## 2021-04-30 DIAGNOSIS — R91.1 LUNG NODULE: ICD-10-CM

## 2021-04-30 LAB — GLUCOSE BLDC GLUCOMTR-MCNC: 102 MG/DL (ref 70–130)

## 2021-04-30 PROCEDURE — A9552 F18 FDG: HCPCS | Performed by: THORACIC SURGERY (CARDIOTHORACIC VASCULAR SURGERY)

## 2021-04-30 PROCEDURE — 82962 GLUCOSE BLOOD TEST: CPT

## 2021-04-30 PROCEDURE — 0 FLUDEOXYGLUCOSE F18 SOLUTION: Performed by: THORACIC SURGERY (CARDIOTHORACIC VASCULAR SURGERY)

## 2021-04-30 PROCEDURE — 78815 PET IMAGE W/CT SKULL-THIGH: CPT

## 2021-04-30 RX ADMIN — FLUDEOXYGLUCOSE F18 1 DOSE: 300 INJECTION INTRAVENOUS at 12:20

## 2021-05-05 ENCOUNTER — OFFICE VISIT (OUTPATIENT)
Dept: OTHER | Facility: HOSPITAL | Age: 86
End: 2021-05-05

## 2021-05-05 VITALS
OXYGEN SATURATION: 98 % | HEART RATE: 81 BPM | DIASTOLIC BLOOD PRESSURE: 56 MMHG | RESPIRATION RATE: 16 BRPM | SYSTOLIC BLOOD PRESSURE: 100 MMHG

## 2021-05-05 DIAGNOSIS — R59.0 MEDIASTINAL LYMPHADENOPATHY: ICD-10-CM

## 2021-05-05 DIAGNOSIS — J18.1 LUNG CONSOLIDATION (HCC): Primary | ICD-10-CM

## 2021-05-05 DIAGNOSIS — R91.1 LUNG NODULE: ICD-10-CM

## 2021-05-05 PROCEDURE — 99213 OFFICE O/P EST LOW 20 MIN: CPT | Performed by: THORACIC SURGERY (CARDIOTHORACIC VASCULAR SURGERY)

## 2021-05-11 ENCOUNTER — OFFICE VISIT (OUTPATIENT)
Dept: FAMILY MEDICINE CLINIC | Facility: CLINIC | Age: 86
End: 2021-05-11

## 2021-05-11 VITALS
DIASTOLIC BLOOD PRESSURE: 76 MMHG | WEIGHT: 166.6 LBS | BODY MASS INDEX: 25.25 KG/M2 | TEMPERATURE: 97.7 F | HEART RATE: 78 BPM | HEIGHT: 68 IN | OXYGEN SATURATION: 98 % | SYSTOLIC BLOOD PRESSURE: 146 MMHG

## 2021-05-11 DIAGNOSIS — F32.1 CURRENT MODERATE EPISODE OF MAJOR DEPRESSIVE DISORDER WITHOUT PRIOR EPISODE (HCC): Primary | ICD-10-CM

## 2021-05-11 PROCEDURE — 99213 OFFICE O/P EST LOW 20 MIN: CPT | Performed by: FAMILY MEDICINE

## 2021-05-11 RX ORDER — NORTRIPTYLINE HYDROCHLORIDE 10 MG/1
10 CAPSULE ORAL NIGHTLY
COMMUNITY
End: 2022-03-28

## 2021-05-11 NOTE — PROGRESS NOTES
"Chief Complaint  Cough (follow up )    Subjective          Nenita Tavares presents to [facility]  History of Present Illness     Follow-up major depression.  Diagnosed last visit.  Suffering from grief is still 1 year ago.  Tragic death of son.  We started Lexapro last visit.  Her mood is stabilized.  Somewhat improved.  The family is happy with the medication.  She has had some lightheadedness at times when she stands up.  No passout episodes.  No focal neurological symptoms.  Otherwise no known side effects of the Escitalopram.  She continues 10 mg daily.  No GI side effects.  Her weight is stabilized.    Cough.  Now resolved.  The CT scan of the chest demonstrated a possible pleural nodule.  She was seen by thoracic surgery.  PET scan was done.  No evidence of metastatic disease.  The consolidations appear to have gotten worse.  Thought to be a possible previous pneumonia.  The radiologist is recommending a 8-week CT follow-up.  Patient is having no dyspnea and has no respiratory symptoms.  She is essentially a never smoker.  She smoked a little bit many years ago for short period of time.    Objective   Vital Signs:   /76   Pulse 78   Temp 97.7 °F (36.5 °C) (Temporal)   Ht 171.5 cm (67.52\")   Wt 75.6 kg (166 lb 9.6 oz)   SpO2 98%   BMI 25.69 kg/m²     Physical Exam  Constitutional:       Appearance: Normal appearance.   Cardiovascular:      Rate and Rhythm: Normal rate and regular rhythm.      Pulses: Normal pulses.   Pulmonary:      Effort: Pulmonary effort is normal.   Neurological:      Mental Status: She is alert.   Psychiatric:      Comments: Mood is depressed but improved.  Affect is less flat.        Result Review :                 Assessment and Plan    Diagnoses and all orders for this visit:    1. Current moderate episode of major depressive disorder without prior episode (CMS/HCC) (Primary)      Major depression.  Improving.  Continue Lexapro and the low-dose " tricyclic.  She is having some orthostatic hypotensive symptoms at times.  I recommend to maintain hydration.  I want a change medicine at this time.  I will see her back in 3 months for recheck.    Cough.  Resolved.    Abnormal CT scan.  To consider repeat CT scan this July or August.      Follow Up   No follow-ups on file.  Patient was given instructions and counseling regarding her condition or for health maintenance advice. Please see specific information pulled into the AVS if appropriate.

## 2021-05-13 NOTE — PROGRESS NOTES
"Chief Complaint  Lung Nodule    Subjective          Nenita Tavares presents to [facility] in consultation for lung abnormalities.  History of Present Illness  Ms. Tavares is a very pleasant 89-year-old lady who presents with complaints of a productive cough for the past 2 years.  She states that her cough did get better after introducing Advair.  She underwent a CT of the chest secondary to her chronic cough which demonstrated some irregularities in the right upper lobe.  She is seen today in consultation.  She denies any significant shortness of breath or dyspnea on exertion.  She is able to do her activities of daily living without difficulty.  She does have a chronic cough which she describes as productive.  She states she has had this cough for greater than 2 years.    She is a never smoker, although does have significant secondhand smoke exposure.  She does have a family history of cancer in her brother with an unknown type.  Objective   Vital Signs:   /79   Pulse 61   Temp 96.5 °F (35.8 °C) (Oral)   Resp 18   Ht 171.5 cm (67.52\")   Wt 76.9 kg (169 lb 8 oz)   SpO2 97% Comment: room air  BMI 26.14 kg/m²     Physical Exam  Vitals and nursing note reviewed.   Constitutional:       Appearance: She is well-developed.   HENT:      Head: Normocephalic and atraumatic.      Nose: Nose normal.   Eyes:      Conjunctiva/sclera: Conjunctivae normal.      Pupils: Pupils are equal, round, and reactive to light.   Cardiovascular:      Rate and Rhythm: Normal rate and regular rhythm.      Heart sounds: Normal heart sounds.   Pulmonary:      Effort: Pulmonary effort is normal.      Breath sounds: Normal breath sounds.   Abdominal:      General: Bowel sounds are normal.      Palpations: Abdomen is soft.   Musculoskeletal:         General: Normal range of motion.      Cervical back: Normal range of motion and neck supple.   Skin:     General: Skin is warm and dry.      Capillary " Refill: Capillary refill takes less than 2 seconds.   Neurological:      Mental Status: She is alert and oriented to person, place, and time.   Psychiatric:         Behavior: Behavior normal.         Thought Content: Thought content normal.         Judgment: Judgment normal.        Result Review :       Data reviewed: Radiologic studies :     I have independently reviewed the CT of the chest performed on 4/13/2021 which demonstrates an irregular consolidation of the right upper lobe measuring 7 x 1 x 3 cm with some associated pleural thickening.  There is a small noncalcified nodule in the right upper lobe as well as the right middle lobe.  There is a groundglass opacity in the left upper lobe.  There is a moderate hiatal hernia.     Assessment and Plan      Ms. Tavares is a very pleasant 89-year-old lady with a 7 cm consolidation/mass of the right upper lobe that is concerning for bronchogenic malignancy given her age and the appearance of the lesion on CT scan.  After much discussion with the patient and her family, we will plan a PET CT scan to further classify this new finding and if this is indeed hypermetabolic then we will discuss possible treatment options although she is not sure that she is willing to undergo any further work-up.  I will plan to see her back after PET CT scan.  Diagnoses and all orders for this visit:    1. Lung consolidation (CMS/HCC) (Primary)    2. Cough      I spent 43 minutes caring for Nenita on this date of service. This time includes time spent by me in the following activities:preparing for the visit, reviewing tests, obtaining and/or reviewing a separately obtained history, performing a medically appropriate examination and/or evaluation , counseling and educating the patient/family/caregiver, ordering medications, tests, or procedures and independently interpreting results and communicating that information with the patient/family/caregiver  Follow Up   No follow-ups on  file.  Patient was given instructions and counseling regarding her condition or for health maintenance advice. Please see specific information pulled into the AVS if appropriate.

## 2021-06-03 NOTE — PROGRESS NOTES
Chief Complaint  Lung Nodule    Subjective          Nenita Tavares presents to [facility]  History of Present Illness  Ms. Tavares is a very pleasant 89-year-old lady who presents with complaints of a productive cough for the past 2 years.  She states that her cough did get better after introducing Advair.  She underwent a CT of the chest secondary to her chronic cough which demonstrated some irregularities in the right upper lobe.  She is seen today in consultation.  She denies any significant shortness of breath or dyspnea on exertion.  She is able to do her activities of daily living without difficulty.  She does have a chronic cough which she describes as productive.  She states she has had this cough for greater than 2 years.     She is a never smoker, although does have significant secondhand smoke exposure.  She does have a family history of cancer in her brother with an unknown type.    Ms. Tavares presents today to discuss her recent PET CT scan.  She has no new complaints today.  Objective   Vital Signs:   /56   Pulse 81   Resp 16   SpO2 98% Comment: room air    Physical Exam  Vitals and nursing note reviewed.   Constitutional:       Appearance: She is well-developed.   HENT:      Head: Normocephalic and atraumatic.      Nose: Nose normal.   Eyes:      Conjunctiva/sclera: Conjunctivae normal.   Cardiovascular:      Rate and Rhythm: Normal rate.   Pulmonary:      Effort: Pulmonary effort is normal.   Abdominal:      Palpations: Abdomen is soft.   Musculoskeletal:      Cervical back: Neck supple.   Skin:     General: Skin is warm and dry.   Neurological:      Mental Status: She is alert and oriented to person, place, and time.   Psychiatric:         Behavior: Behavior normal.         Thought Content: Thought content normal.         Judgment: Judgment normal.        Result Review :       Data reviewed: Radiologic studies :     I have independently reviewed the  PET CT scan performed on 4/30/2021 which demonstrates bilateral masslike pulmonary opacification within the upper lobes which has decreased on the right and is new on the left with mild hypermetabolism.  Multiple subcentimeter pulmonary nodules unchanged.  Mild FDG uptake in the mediastinal lymph nodes.     Assessment and Plan      Ms. Tavares is a very pleasant 89-year-old lady with waxing and waning upper lobe consolidations consistent with organizing pneumonia.  She does have multiple pulmonary nodules as well as mediastinal lymphadenopathy that likely represent a benign etiology as well.  I would recommend continued surveillance with a CT of the chest in 4 to 6 months, but the patient requests no further follow-up given her age and comorbidities.  I think this is reasonable and she will plan to call the office if she has any difficulties.  Diagnoses and all orders for this visit:    1. Lung consolidation (CMS/HCC) (Primary)    2. Lung nodule    3. Mediastinal lymphadenopathy      I spent 22 minutes caring for Nenita on this date of service. This time includes time spent by me in the following activities:preparing for the visit, reviewing tests, obtaining and/or reviewing a separately obtained history, performing a medically appropriate examination and/or evaluation , counseling and educating the patient/family/caregiver and independently interpreting results and communicating that information with the patient/family/caregiver  Follow Up   No follow-ups on file.  Patient was given instructions and counseling regarding her condition or for health maintenance advice. Please see specific information pulled into the AVS if appropriate.

## 2022-02-16 NOTE — TELEPHONE ENCOUNTER
Rx Refill Note  Requested Prescriptions     Pending Prescriptions Disp Refills   • fluticasone-salmeterol (ADVAIR) 250-50 MCG/DOSE DISKUS [Pharmacy Med Name: Fluticasone-Salmeterol 250-50 MCG/DOSE Inhalation Aerosol Powder Breath Activated] 60 each 0     Sig: INHALE 1 DOSE BY MOUTH TWICE DAILY      Last office visit with prescribing clinician: 5/11/2021      Next office visit with prescribing clinician: Visit date not found            Fabi Whitehead MA  02/16/22, 11:17 EST

## 2022-03-28 RX ORDER — NORTRIPTYLINE HYDROCHLORIDE 10 MG/1
CAPSULE ORAL
Qty: 180 CAPSULE | Refills: 0 | Status: SHIPPED | OUTPATIENT
Start: 2022-03-28 | End: 2022-06-23

## 2022-06-23 RX ORDER — NORTRIPTYLINE HYDROCHLORIDE 10 MG/1
CAPSULE ORAL
Qty: 180 CAPSULE | Refills: 0 | Status: SHIPPED | OUTPATIENT
Start: 2022-06-23 | End: 2022-07-22 | Stop reason: SDUPTHER

## 2022-07-22 ENCOUNTER — OFFICE VISIT (OUTPATIENT)
Dept: GASTROENTEROLOGY | Facility: CLINIC | Age: 87
End: 2022-07-22

## 2022-07-22 DIAGNOSIS — K58.0 IRRITABLE BOWEL SYNDROME WITH DIARRHEA: Chronic | ICD-10-CM

## 2022-07-22 DIAGNOSIS — K21.9 GASTROESOPHAGEAL REFLUX DISEASE, UNSPECIFIED WHETHER ESOPHAGITIS PRESENT: Primary | Chronic | ICD-10-CM

## 2022-07-22 PROCEDURE — 99442 PR PHYS/QHP TELEPHONE EVALUATION 11-20 MIN: CPT | Performed by: NURSE PRACTITIONER

## 2022-07-22 RX ORDER — NORTRIPTYLINE HYDROCHLORIDE 10 MG/1
CAPSULE ORAL
Qty: 180 CAPSULE | Refills: 3 | Status: SHIPPED | OUTPATIENT
Start: 2022-07-22

## 2022-07-22 NOTE — PROGRESS NOTES
Chief Complaint   Patient presents with   • Follow-up     GERD, IBS-D         History of Present Illness  89-year-old female presents today for telephone follow-up.  She was last seen via telephone visit on 2/19/2021.  She has a history of GERD and IBS-D.    GERD was omeprazole 20 mg once daily with good control of symptoms.  She obtained this medication over-the-counter.  She denies any nausea, vomiting, or dysphagia.    For IBS-D, nortriptyline 10 mg at bedtime works well to manage symptoms.  She also continues a daily probiotic.  Escitalopram was listed in her medication profile, but the patient no longer takes this medication.  She reports the loss of her spouse approximately 2 months ago in a car accident which is caused increased stress.  She reports having regular daily bowel movements with use of nortriptyline and denies any melena, hematochezia, or abdominal pain    You have chosen to receive care through a telephone visit.   Do you consent to use a telephone visit for your medical care today? Yes    Review of Systems   Constitutional: Negative for fever and unexpected weight change.   HENT: Negative for trouble swallowing.    Cardiovascular: Negative for chest pain.   Gastrointestinal: Negative for abdominal distention, abdominal pain, anal bleeding, blood in stool, constipation, diarrhea, nausea, rectal pain and vomiting.      Result Review :      Office Visit with Eneida Rivera APRN (02/19/2021)  Comprehensive Metabolic Panel (03/30/2021 14:10)  CBC & Differential (03/30/2021 14:10)  [facility] this is Eneida Rivera nurse practitioner how are you okay I okay for your visit over the phone  [facility] packAssessment and Plan    Diagnoses and all orders for this visit:    1. Gastroesophageal reflux disease, unspecified whether esophagitis present (Primary)    2. Irritable bowel syndrome with diarrhea    Other orders  -     nortriptyline (PAMELOR) 10 MG capsule; Take 2 capsules at bedtime  Dispense: 180  capsule; Refill: 3         This visit has been rescheduled as a phone visit to comply with patient safety concerns in accordance with CDC recommendations. Total time of discussion was 11 minutes.      Patient Instructions   1. For GERD, continue omeprazole 20 mg once daily.     2. For IBS-D, you may continue use of nortriptyline 20 mg nightly. Refills have been sent to your pharmacy.    3.  We recommend annual follow up in 1 year for reassessment of symptoms and medication refills.      Discussion:    Patient to continue omeprazole 20 mg once daily for GERD.  For IBS-D, refills for nortriptyline have been sent to her pharmacy.  Escitalopram was listed in the patient's medication profile, but the patient is not taking this medication.  There is potential drug interaction with escitalopram and nortriptyline and this was removed from her profile.  We will plan for annual follow-up in 1 year via telephone visit on 7/12/2023 at 1 PM for reassessment of symptoms, or sooner should symptoms worsen or fail to improve.  Patient verbalized understanding above plan of care and is in agreement.  All questions answered and support provided.    EMR Dragon/Transcription Disclaimer:  This document has been Dictated utilizing Dragon dictation.

## 2022-07-22 NOTE — PATIENT INSTRUCTIONS
For GERD, continue omeprazole 20 mg once daily.     2. For IBS-D, you may continue use of nortriptyline 20 mg nightly. Refills have been sent to your pharmacy.    3.  We recommend annual follow up in 1 year for reassessment of symptoms and medication refills.

## 2022-08-22 ENCOUNTER — OFFICE VISIT (OUTPATIENT)
Dept: FAMILY MEDICINE CLINIC | Facility: CLINIC | Age: 87
End: 2022-08-22

## 2022-08-22 VITALS
HEIGHT: 67 IN | OXYGEN SATURATION: 97 % | WEIGHT: 174.3 LBS | BODY MASS INDEX: 27.36 KG/M2 | TEMPERATURE: 96 F | DIASTOLIC BLOOD PRESSURE: 79 MMHG | SYSTOLIC BLOOD PRESSURE: 141 MMHG | HEART RATE: 70 BPM

## 2022-08-22 DIAGNOSIS — I49.9 IRREGULAR HEART RHYTHM: ICD-10-CM

## 2022-08-22 DIAGNOSIS — Z00.00 MEDICARE ANNUAL WELLNESS VISIT, SUBSEQUENT: Primary | ICD-10-CM

## 2022-08-22 PROCEDURE — 1170F FXNL STATUS ASSESSED: CPT | Performed by: FAMILY MEDICINE

## 2022-08-22 PROCEDURE — 99213 OFFICE O/P EST LOW 20 MIN: CPT | Performed by: FAMILY MEDICINE

## 2022-08-22 PROCEDURE — 1160F RVW MEDS BY RX/DR IN RCRD: CPT | Performed by: FAMILY MEDICINE

## 2022-08-22 PROCEDURE — 93000 ELECTROCARDIOGRAM COMPLETE: CPT | Performed by: FAMILY MEDICINE

## 2022-08-22 PROCEDURE — G0439 PPPS, SUBSEQ VISIT: HCPCS | Performed by: FAMILY MEDICINE

## 2022-08-22 NOTE — PROGRESS NOTES
The ABCs of the Annual Wellness Visit  Subsequent Medicare Wellness Visit    Chief Complaint   Patient presents with   • Medicare Wellness-subsequent      Subjective    History of Present Illness:  Nenita Tavares is a 89 y.o. female who presents for a Subsequent Medicare Wellness Visit.    The following portions of the patient's history were reviewed and   updated as appropriate: allergies, current medications, past family history, past medical history, past social history, past surgical history and problem list.    Compared to one year ago, the patient feels her physical   health is the same.    Compared to one year ago, the patient feels her mental   health is worse.    Recent Hospitalizations:  She was not admitted to the hospital during the last year.       Current Medical Providers:  Patient Care Team:  Srini Gustafson MD as PCP - General  Srini Gustafson MD as PCP - Family Medicine    Outpatient Medications Prior to Visit   Medication Sig Dispense Refill   • nortriptyline (PAMELOR) 10 MG capsule Take 2 capsules at bedtime 180 capsule 3   • omeprazole (priLOSEC) 20 MG capsule Take  by mouth.     • Probiotic Product (PROBIOTIC DAILY PO) Take  by mouth.     • Fexofenadine HCl (MUCINEX ALLERGY PO) Take  by mouth.     • fluticasone (Flonase) 50 MCG/ACT nasal spray 2 sprays into the nostril(s) as directed by provider Daily. 16 g 6   • fluticasone-salmeterol (ADVAIR) 250-50 MCG/DOSE DISKUS INHALE 1 DOSE BY MOUTH TWICE DAILY 60 each 0   • Lactase (LACTAID PO) Take  by mouth as needed.       No facility-administered medications prior to visit.       No opioid medication identified on active medication list. I have reviewed chart for other potential  high risk medication/s and harmful drug interactions in the elderly.          Aspirin is not on active medication list.  Aspirin use is not indicated based on review of current medical condition/s. Risk of harm outweighs potential benefits.  .    Patient Active Problem  "List   Diagnosis   • HLD (hyperlipidemia)   • Irritable bowel syndrome with diarrhea   • MCI (mild cognitive impairment)     Advance Care Planning  Advance Directive is on file.  Advance directives not discussed today.  Advance directive is on file.          Objective    Vitals:    08/22/22 1141   BP: 141/79   Pulse: 70   Temp: 96 °F (35.6 °C)   TempSrc: Temporal   SpO2: 97%   Weight: 79.1 kg (174 lb 4.8 oz)   Height: 170.2 cm (67\")     Estimated body mass index is 27.3 kg/m² as calculated from the following:    Height as of this encounter: 170.2 cm (67\").    Weight as of this encounter: 79.1 kg (174 lb 4.8 oz).    BMI is >= 25 and <30. (Overweight) The following options were offered after discussion;: exercise counseling/recommendations      Does the patient have evidence of cognitive impairment? No    Physical Exam            Patient refused health risk assessment questionnaires      HEALTH RISK ASSESSMENT    Smoking Status:  Social History     Tobacco Use   Smoking Status Never Smoker   Smokeless Tobacco Never Used     Alcohol Consumption:  Social History     Substance and Sexual Activity   Alcohol Use Yes    Comment: social drink     Fall Risk Screen:    STEADI Fall Risk Assessment was completed, and patient is at LOW risk for falls.Assessment completed on:8/22/2022    Depression Screening:  PHQ-2/PHQ-9 Depression Screening 3/30/2021   Retired PHQ-9 Total Score 22   Retired Total Score 22       Health Habits and Functional and Cognitive Screening:  Functional & Cognitive Status 1/18/2019   Do you have difficulty preparing food and eating? No   Do you have difficulty bathing yourself, getting dressed or grooming yourself? No   Do you have difficulty using the toilet? No   Do you have difficulty moving around from place to place? No   Do you have trouble with steps or getting out of a bed or a chair? No   Current Diet Unhealthy Diet   Dental Exam Up to date   Eye Exam Up to date   Exercise (times per week) 2 times " per week   Current Exercise Activities Include Cardiovasular Workout on Exercise Equipment   Do you need help using the phone?  No   Are you deaf or do you have serious difficulty hearing?  No   Do you need help with transportation? No   Do you need help shopping? No   Do you need help preparing meals?  No   Do you need help with housework?  No   Do you need help with laundry? No   Do you need help taking your medications? No   Do you need help managing money? No   Do you ever drive or ride in a car without wearing a seat belt? No   Have you felt unusual stress, anger or loneliness in the last month? No   Who do you live with? Spouse   If you need help, do you have trouble finding someone available to you? No   Have you been bothered in the last four weeks by sexual problems? No   Do you have difficulty concentrating, remembering or making decisions? No       Age-appropriate Screening Schedule:  Refer to the list below for future screening recommendations based on patient's age, sex and/or medical conditions. Orders for these recommended tests are listed in the plan section. The patient has been provided with a written plan.    Health Maintenance   Topic Date Due   • TDAP/TD VACCINES (1 - Tdap) Never done   • ZOSTER VACCINE (1 of 2) Never done   • DXA SCAN  07/16/2016   • LIPID PANEL  Never done   • INFLUENZA VACCINE  10/01/2022   • MAMMOGRAM  Discontinued              Assessment & Plan   CMS Preventative Services Quick Reference  Risk Factors Identified During Encounter  Grief, alcohol use counseling today, lives alone.  The above risks/problems have been discussed with the patient.  Follow up actions/plans if indicated are seen below in the Assessment/Plan Section.  Pertinent information has been shared with the patient in the After Visit Summary.    Diagnoses and all orders for this visit:    1. Medicare annual wellness visit, subsequent (Primary)    2. Irregular heart rhythm  -     ECG 12 Lead        Follow Up:    No follow-ups on file.     An After Visit Summary and PPPS were made available to the patient.

## 2022-08-22 NOTE — PROGRESS NOTES
"Chief Complaint  Medicare Wellness-subsequent    Subjective        Nenita Tavares presents to [facility]  History of Present Illness    He is here for annual Medicare checkup.  She has no complaints about her health.  She continues nortriptyline which is prescribed by her gastroenterologist.  No other medications.  Her   in a car accident about 3 months ago.  She states she is coping.  She does not feel sad or depressed often.  She states she drinks about 1 unit of alcohol a day.  Either a mini bottle of wine, approximately 8 ounces.  Or a high ball which is about 1 or 2 shots of whiskey.  She states she does not drink more.  No chest pain.  No palpitations.  No lightheadedness.    Objective   Vital Signs:  /79   Pulse 70   Temp 96 °F (35.6 °C) (Temporal)   Ht 170.2 cm (67\")   Wt 79.1 kg (174 lb 4.8 oz)   SpO2 97%   BMI 27.30 kg/m²   Estimated body mass index is 27.3 kg/m² as calculated from the following:    Height as of this encounter: 170.2 cm (67\").    Weight as of this encounter: 79.1 kg (174 lb 4.8 oz).          Physical Exam  Constitutional:       Appearance: Normal appearance.   Cardiovascular:      Rate and Rhythm: Normal rate. Rhythm irregular.      Pulses: Normal pulses.      Heart sounds: Murmur (1/6 systolic murmur) heard.      Comments: Irregularly irregular rhythm  Pulmonary:      Effort: Pulmonary effort is normal.      Breath sounds: Normal breath sounds.   Neurological:      Mental Status: She is alert.        Result Review :           ECG 12 Lead    Date/Time: 2022 12:15 PM  Performed by: Srini Gustafson MD  Authorized by: Srini Gustafson MD   Comparison: compared with previous ECG from 2019  Rhythm: sinus rhythm  Rate: normal  Conduction: conduction normal  ST Segments: ST segments normal  T Waves: T waves normal  QRS axis: normal  Other: no other findings  Other findings: non-specific ST-T wave changes    Clinical impression: " non-specific ECG  Comments: No evidence of atrial fibrillation              Assessment and Plan   Diagnoses and all orders for this visit:    1. Medicare annual wellness visit, subsequent (Primary)    2. Irregular heart rhythm  -     ECG 12 Lead      Here for annual Medicare wellness visit    On examination irregular heart rhythm.  Likely sinus arrhythmia.  EKG today reveals no evidence of atrial fibrillation or ectopy.  At this time no further investigation needed.  Patient is asymptomatic.  I will see her back in 1 year for Medicare wellness visit.         Follow Up   No follow-ups on file.  Patient was given instructions and counseling regarding her condition or for health maintenance advice. Please see specific information pulled into the AVS if appropriate.

## 2023-02-16 ENCOUNTER — TELEPHONE (OUTPATIENT)
Dept: FAMILY MEDICINE CLINIC | Facility: CLINIC | Age: 88
End: 2023-02-16

## 2023-02-16 NOTE — TELEPHONE ENCOUNTER
Caller: Nenita Tavares    Relationship to patient: Self    Best call back number: [phone]    Date of exposure: UNKOWN    Date of positive COVID19 test: 2/16/23    COVID19 symptoms: RUNNY NOSE, CONGESTION, COUGH    Additional information or concerns: PATIENT REQUESTS A CALL BACK TO DISCUSS SUGGESTED TREATMENT OPTIONS    What is the patients preferred pharmacy: [address] [address] [facility] [phone] [facility] [phone]

## 2023-02-17 ENCOUNTER — OFFICE VISIT (OUTPATIENT)
Dept: FAMILY MEDICINE CLINIC | Facility: CLINIC | Age: 88
End: 2023-02-17
Payer: MEDICARE

## 2023-02-17 VITALS
TEMPERATURE: 97.5 F | OXYGEN SATURATION: 99 % | WEIGHT: 174 LBS | HEIGHT: 67 IN | HEART RATE: 79 BPM | RESPIRATION RATE: 18 BRPM | SYSTOLIC BLOOD PRESSURE: 118 MMHG | BODY MASS INDEX: 27.31 KG/M2 | DIASTOLIC BLOOD PRESSURE: 78 MMHG

## 2023-02-17 DIAGNOSIS — U07.1 COVID-19 VIRUS INFECTION: Primary | ICD-10-CM

## 2023-02-17 PROCEDURE — 99213 OFFICE O/P EST LOW 20 MIN: CPT | Performed by: FAMILY MEDICINE

## 2023-02-17 NOTE — PROGRESS NOTES
"Chief Complaint  Chief Complaint   Patient presents with   • Cough     Pt c/o cough, sore throat, head pressure. Pt has tested pos as of 2/15     COVID-19 positive    Subjective    History of Present Illness        Nenita Tavares presents to [facility] for   History of Present Illness  The patient is a 89-year-old female who presents with complaints of a cold. She is accompanied by her daughter.    COVID-19  The patient tested positive for COVID-19 on 02/15/2023. She states she feels like she has \" a really bad cold.\" The patient complains of productive cough. She has been taking Claritin, a \"throat spray,\" and Delsym. Her daughter is concerned due to the patient's worsening symptoms and her age. She also notes the patient's  passed away in 05/2022 and they are both still grieving. The patient states she has a life alert.       Objective   Vital Signs:   Visit Vitals  /78   Pulse 79   Temp 97.5 °F (36.4 °C)   Resp 18   Ht 170.2 cm (67\")   Wt 78.9 kg (174 lb)   SpO2 99%   BMI 27.25 kg/m²          Physical Exam  Vitals reviewed.   Constitutional:       Appearance: She is well-developed. She is ill-appearing.   HENT:      Head: Normocephalic.      Right Ear: External ear normal.      Left Ear: External ear normal.      Nose: Nose normal.      Mouth/Throat:      Pharynx: Posterior oropharyngeal erythema present.   Eyes:      Conjunctiva/sclera: Conjunctivae normal.   Cardiovascular:      Rate and Rhythm: Normal rate and regular rhythm.   Pulmonary:      Effort: Pulmonary effort is normal.      Breath sounds: Normal breath sounds.   Musculoskeletal:         General: Normal range of motion.      Cervical back: Normal range of motion and neck supple.   Skin:     General: Skin is warm and dry.      Capillary Refill: Capillary refill takes less than 2 seconds.   Neurological:      Mental Status: She is alert and oriented to person, place, and time.              Result Review " :                      Assessment and Plan      Diagnoses and all orders for this visit:    1. COVID-19 virus infection (Primary)  Comments:  - Recommend taking vitamin C, D, and zinc.  Assessment & Plan:  she was prescribed Paxlovid to treat her symptoms.    Increase fluids. Tylenol/motrin for pain or fever.   Medication and medication adverse effects discussed.    Follow-up 5-7 days for reevaluation if not improved or sooner if needed.      Orders:  -     Nirmatrelvir&Ritonavir 300/100 (PAXLOVID) 20 x 150 MG & 10 x 100MG tablet therapy pack tablet; Take 3 tablets by mouth 2 (Two) Times a Day for 5 days.  Dispense: 30 tablet; Refill: 0           Follow Up   No follow-ups on file.  Patient was given instructions and counseling regarding her condition or for health maintenance advice. Please see specific information pulled into the AVS if appropriate.       Transcribed from ambient dictation for Conrad Mustafa Sr, MD by Rozina Rodgers.  02/17/23   16:39 EST    Patient or patient representative verbalized consent to the visit recording.  I have personally performed the services described in this document as transcribed by the above individual, and it is both accurate and complete.

## 2023-02-28 PROBLEM — U07.1 COVID-19 VIRUS INFECTION: Status: ACTIVE | Noted: 2023-02-28

## 2023-02-28 NOTE — ASSESSMENT & PLAN NOTE
she was prescribed Paxlovid to treat her symptoms.    Increase fluids. Tylenol/motrin for pain or fever.   Medication and medication adverse effects discussed.    Follow-up 5-7 days for reevaluation if not improved or sooner if needed.

## 2023-08-24 ENCOUNTER — OFFICE VISIT (OUTPATIENT)
Dept: FAMILY MEDICINE CLINIC | Facility: CLINIC | Age: 88
End: 2023-08-24
Payer: MEDICARE

## 2023-08-24 VITALS
SYSTOLIC BLOOD PRESSURE: 129 MMHG | DIASTOLIC BLOOD PRESSURE: 67 MMHG | OXYGEN SATURATION: 99 % | WEIGHT: 167.5 LBS | HEART RATE: 94 BPM | HEIGHT: 67 IN | TEMPERATURE: 96.9 F | BODY MASS INDEX: 26.29 KG/M2

## 2023-08-24 DIAGNOSIS — Z00.00 MEDICARE ANNUAL WELLNESS VISIT, SUBSEQUENT: Primary | ICD-10-CM

## 2023-08-24 NOTE — PROGRESS NOTES
The ABCs of the Annual Wellness Visit  Subsequent Medicare Wellness Visit    Subjective    Nenita Tavares is a 89 y.o. female who presents for a Subsequent Medicare Wellness Visit.    The following portions of the patient's history were reviewed and   updated as appropriate: allergies, current medications, past family history, past medical history, past social history, past surgical history, and problem list.    Compared to one year ago, the patient feels her physical   health is the same.    Compared to one year ago, the patient feels her mental   health is worse.    Recent Hospitalizations:  She was not admitted to the hospital during the last year.       Current Medical Providers:  Patient Care Team:  Srini Gustafson MD as PCP - General  Srini Gustafson MD as PCP - Family Medicine    Outpatient Medications Prior to Visit   Medication Sig Dispense Refill    Lactase (LACTAID PO) Take  by mouth as needed.      nortriptyline (PAMELOR) 10 MG capsule Take 2 capsules at bedtime 180 capsule 3    omeprazole (priLOSEC) 20 MG capsule Take  by mouth.      Probiotic Product (PROBIOTIC DAILY PO) Take  by mouth.      Fexofenadine HCl (MUCINEX ALLERGY PO) Take  by mouth. (Patient not taking: Reported on 8/24/2023)      fluticasone (Flonase) 50 MCG/ACT nasal spray 2 sprays into the nostril(s) as directed by provider Daily. (Patient not taking: Reported on 8/24/2023) 16 g 6    fluticasone-salmeterol (ADVAIR) 250-50 MCG/DOSE DISKUS INHALE 1 DOSE BY MOUTH TWICE DAILY (Patient not taking: Reported on 8/24/2023) 60 each 0     No facility-administered medications prior to visit.       No opioid medication identified on active medication list. I have reviewed chart for other potential  high risk medication/s and harmful drug interactions in the elderly.        Aspirin is not on active medication list.  Aspirin use is not indicated based on review of current medical condition/s. Risk of harm outweighs potential benefits.  .    Patient  "Active Problem List   Diagnosis    HLD (hyperlipidemia)    Irritable bowel syndrome with diarrhea    MCI (mild cognitive impairment)    COVID-19 virus infection     Advance Care Planning   Advance Care Planning     Advance Directive is on file.  ACP discussion was held with the patient during this visit. Patient has an advance directive in EMR which is still valid.      Objective    Vitals:    23 1133   BP: 129/67   Pulse: 94   Temp: 96.9 øF (36.1 øC)   TempSrc: Temporal   SpO2: 99%   Weight: 76 kg (167 lb 8 oz)   Height: 170.2 cm (67\")     Estimated body mass index is 26.23 kg/mý as calculated from the following:    Height as of this encounter: 170.2 cm (67\").    Weight as of this encounter: 76 kg (167 lb 8 oz).    BMI is >= 25 and <30. (Overweight) The following options were offered after discussion;: exercise counseling/recommendations      Does the patient have evidence of cognitive impairment? No          HEALTH RISK ASSESSMENT    Smoking Status:  Social History     Tobacco Use   Smoking Status Never   Smokeless Tobacco Never     Alcohol Consumption:  Social History     Substance and Sexual Activity   Alcohol Use Yes    Comment: social drink     Fall Risk Screen:    STEADI Fall Risk Assessment was completed, and patient is at LOW risk for falls.Assessment completed on:2023    Depression Screenin/24/2023    11:31 AM   PHQ-2/PHQ-9 Depression Screening   Little Interest or Pleasure in Doing Things 0-->not at all   Feeling Down, Depressed or Hopeless 3-->nearly every day   Trouble Falling or Staying Asleep, or Sleeping Too Much 3-->nearly every day   Feeling Tired or Having Little Energy 3-->nearly every day   Poor Appetite or Overeating 0-->not at all   Feeling Bad about Yourself - or that You are a Failure or Have Let Yourself or Your Family Down 0-->not at all   Trouble Concentrating on Things, Such as Reading the Newspaper or Watching Television 0-->not at all   Moving or Speaking So Slowly " that Other People Could Have Noticed? Or the Opposite - Being So Fidgety 0-->not at all   Thoughts that You Would be Better Off Dead or of Hurting Yourself in Some Way 0-->not at all   PHQ-9: Brief Depression Severity Measure Score 9   If You Checked Off Any Problems, How Difficult Have These Problems Made It For You to Do Your Work, Take Care of Things at Home, or Get Along with Other People? not difficult at all       Health Habits and Functional and Cognitive Screenin/24/2023    11:28 AM   Functional & Cognitive Status   Do you have difficulty preparing food and eating? No   Do you have difficulty bathing yourself, getting dressed or grooming yourself? No   Do you have difficulty using the toilet? No   Do you have difficulty moving around from place to place? No   Do you have trouble with steps or getting out of a bed or a chair? No   Current Diet Limited Junk Food   Dental Exam Up to date   Eye Exam Unknown   Exercise (times per week) 0 times per week   Current Exercises Include No Regular Exercise   Do you need help using the phone?  No   Are you deaf or do you have serious difficulty hearing?  Yes   Do you need help to go to places out of walking distance? No   Do you need help shopping? No   Do you need help preparing meals?  No   Do you need help with housework?  No   Do you need help with laundry? No   Do you need help taking your medications? No   Do you need help managing money? No   Do you ever drive or ride in a car without wearing a seat belt? No   Have you felt unusual stress, anger or loneliness in the last month? Yes   Who do you live with? Alone   If you need help, do you have trouble finding someone available to you? No   Have you been bothered in the last four weeks by sexual problems? No   Do you have difficulty concentrating, remembering or making decisions? No       Age-appropriate Screening Schedule:  Refer to the list below for future screening recommendations based on patient's  age, sex and/or medical conditions. Orders for these recommended tests are listed in the plan section. The patient has been provided with a written plan.    Health Maintenance   Topic Date Due    TDAP/TD VACCINES (1 - Tdap) Never done    ZOSTER VACCINE (1 of 2) Never done    DXA SCAN  2016    LIPID PANEL  Never done    COVID-19 Vaccine (4 - Pfizer series) 2021    ANNUAL WELLNESS VISIT  2023    INFLUENZA VACCINE  10/01/2023    Pneumococcal Vaccine 65+  Completed                  CMS Preventative Services Quick Reference  Risk Factors Identified During Encounter  Depression/Dysphoria: Current medication adjusted.  Follow up visit planned.  Fall Risk-High or Moderate: Discussed Fall Prevention in the home  The above risks/problems have been discussed with the patient.  Pertinent information has been shared with the patient in the After Visit Summary.  An After Visit Summary and PPPS were made available to the patient.    Follow Up:   Next Medicare Wellness visit to be scheduled in 1 year.       Additional E&M Note during same encounter follows:  Patient has multiple medical problems which are significant and separately identifiable that require additional work above and beyond the Medicare Wellness Visit.      Chief Complaint  Medicare Wellness-subsequent and Insomnia    Subjective        HPI  Nenita Tavares is also being seen today for insomnia.  Longstanding.  She has been on nortriptyline 20 mg at nighttime for a number of years mostly for IBS-like symptoms.  Her   over a year ago.  She states she is coping.  Still grieving.  She does not feel sad or depressed often, but her PHQ-9 score is 9.  But not very difficult.  She does not feel as if she is depressed.  She states she will have trouble falling asleep and stay up most nights.  She does drink some caffeine.  No trouble with cognitive impairment that is concerning to the family.  She still drives.  She has social interaction with her  "friends in the neighborhood.  No falls with exception of the [place] she had a fall and fractured her left clavicle.  She lost her footing.  That has healed well without ongoing pain.         Objective   Vital Signs:  /67   Pulse 94   Temp 96.9 øF (36.1 øC) (Temporal)   Ht 170.2 cm (67\")   Wt 76 kg (167 lb 8 oz)   SpO2 99%   BMI 26.23 kg/mý     Physical Exam  Constitutional:       Appearance: Normal appearance.   HENT:      Head: Atraumatic.      Mouth/Throat:      Pharynx: Oropharynx is clear. No oropharyngeal exudate or posterior oropharyngeal erythema.   Eyes:      Conjunctiva/sclera: Conjunctivae normal.   Cardiovascular:      Rate and Rhythm: Normal rate and regular rhythm.      Pulses: Normal pulses.      Heart sounds: Normal heart sounds.   Pulmonary:      Effort: Pulmonary effort is normal.      Breath sounds: Normal breath sounds.   Musculoskeletal:         General: Deformity (Left distal clavicle) present. Normal range of motion.      Cervical back: Normal range of motion and neck supple. No muscular tenderness.   Lymphadenopathy:      Cervical: No cervical adenopathy.   Skin:     General: Skin is warm and dry.      Findings: No rash.   Neurological:      General: No focal deficit present.      Mental Status: She is alert and oriented to person, place, and time.   Psychiatric:         Mood and Affect: Mood normal.                       Assessment and Plan   Diagnoses and all orders for this visit:    1. Medicare annual wellness visit, subsequent (Primary)      Annual Medicare wellness visit.    Immunizations.  Patient refused further immunization including pneumococcal vaccine.    Insomnia.  Likely multiple factors.  There is likely an element of depression.  But certainly not severe.  He continues her grief with the death of her  over a year ago.  She is on nortriptyline for a number of years prescribed by GI.  I want her to increase the dose of 20 mg at nighttime up to 30 mg nighttime. "  She can take 3 of the 10 mg capsules.  I want to see her back in 3 months.  If she is still having issues, to consider switching to doxepin or trazodone.  I do not recommend Ambien.         Follow Up   No follow-ups on file.  Patient was given instructions and counseling regarding her condition or for health maintenance advice. Please see specific information pulled into the AVS if appropriate.

## 2023-11-13 ENCOUNTER — OFFICE VISIT (OUTPATIENT)
Dept: FAMILY MEDICINE CLINIC | Facility: CLINIC | Age: 88
End: 2023-11-13
Payer: MEDICARE

## 2023-11-13 VITALS
WEIGHT: 167.6 LBS | HEIGHT: 67 IN | OXYGEN SATURATION: 100 % | TEMPERATURE: 97.3 F | BODY MASS INDEX: 26.3 KG/M2 | HEART RATE: 89 BPM | DIASTOLIC BLOOD PRESSURE: 72 MMHG | SYSTOLIC BLOOD PRESSURE: 139 MMHG

## 2023-11-13 DIAGNOSIS — G47.00 INSOMNIA, UNSPECIFIED TYPE: Primary | ICD-10-CM

## 2023-11-13 PROCEDURE — 99213 OFFICE O/P EST LOW 20 MIN: CPT | Performed by: FAMILY MEDICINE

## 2023-11-13 NOTE — PROGRESS NOTES
"Chief Complaint  Insomnia    Subjective        Nenita Tavares presents to [facility]  History of Present Illness    Follow-up insomnia.  Longstanding.  Also IBS.  She is prescribed nortriptyline 20 mg at nighttime by GI.  At last visit she was complaining of ongoing insomnia issues.  We will up to 30 mg, 3 tablets at nighttime.  But she states it is no change.  She is still taking it.  She denies depression.  She does not feel sad or depressed on a regular basis.  There is no suicidal ideation.  No severe anxiety.  She has 1 small glass of wine most evenings.  She does not drink more.  She takes Benadryl for sleep but does not help.  Her sleep hygiene is fair.    Objective   Vital Signs:  /72   Pulse 89   Temp 97.3 °F (36.3 °C) (Temporal)   Ht 170.2 cm (67\")   Wt 76 kg (167 lb 9.6 oz)   SpO2 100%   BMI 26.25 kg/m²   Estimated body mass index is 26.25 kg/m² as calculated from the following:    Height as of this encounter: 170.2 cm (67\").    Weight as of this encounter: 76 kg (167 lb 9.6 oz).               Physical Exam  Vitals and nursing note reviewed.   Constitutional:       General: She is not in acute distress.     Appearance: She is well-developed.   Cardiovascular:      Rate and Rhythm: Normal rate and regular rhythm.      Heart sounds: Normal heart sounds.   Pulmonary:      Effort: Pulmonary effort is normal.      Breath sounds: Normal breath sounds.   Skin:     General: Skin is warm and dry.   Psychiatric:         Mood and Affect: Mood normal.        Result Review :                   Assessment and Plan   Diagnoses and all orders for this visit:    1. Insomnia, unspecified type (Primary)      Follow-up longstanding insomnia.  Likely multifactorial.  I cannot recommend the alcohol use.  Note - she is not drinking heavily.  I cannot recommend the Benadryl.  I want her to decrease her nortriptyline back down to 20 mg as prescribed by GI.  No further intervention needed " at this time.  I did discuss sleep hygiene issues.  If she starts feeling very sad or depressed we will treat that, otherwise I will see her back next September for annual Medicare wellness visit, sooner as needed.         Follow Up   No follow-ups on file.  Patient was given instructions and counseling regarding her condition or for health maintenance advice. Please see specific information pulled into the AVS if appropriate.

## 2024-03-25 ENCOUNTER — HOSPITAL ENCOUNTER (OUTPATIENT)
Dept: GENERAL RADIOLOGY | Facility: HOSPITAL | Age: 89
Discharge: HOME | End: 2024-03-25
Admitting: FAMILY MEDICINE
Payer: MEDICARE

## 2024-03-25 ENCOUNTER — OFFICE VISIT (OUTPATIENT)
Dept: FAMILY MEDICINE CLINIC | Facility: CLINIC | Age: 89
End: 2024-03-25
Payer: MEDICARE

## 2024-03-25 VITALS
TEMPERATURE: 97.8 F | DIASTOLIC BLOOD PRESSURE: 73 MMHG | HEIGHT: 67 IN | OXYGEN SATURATION: 100 % | SYSTOLIC BLOOD PRESSURE: 138 MMHG | BODY MASS INDEX: 26.25 KG/M2 | HEART RATE: 82 BPM

## 2024-03-25 DIAGNOSIS — S42.002A CLOSED DISPLACED FRACTURE OF LEFT CLAVICLE, UNSPECIFIED PART OF CLAVICLE, INITIAL ENCOUNTER: Primary | ICD-10-CM

## 2024-03-25 DIAGNOSIS — S42.002A CLOSED DISPLACED FRACTURE OF LEFT CLAVICLE, UNSPECIFIED PART OF CLAVICLE, INITIAL ENCOUNTER: ICD-10-CM

## 2024-03-25 PROCEDURE — 73000 X-RAY EXAM OF COLLAR BONE: CPT

## 2024-03-25 NOTE — PROGRESS NOTES
"Chief Complaint  Mass (Left shoulder mass x couple of months no pain )    Subjective        Nenita Tavares presents to [facility]  History of Present Illness    2 Bluf[place] is ago patient was in [place].  She states she fell.  Injured her left shoulder.  Went to a physician there.  Had x-ray done.  But she cannot remember what the x-ray showed.  She states she brought to us but I do not see any record.  It was all black and blue and painful and swollen for some time.  Has not bothered her since.  But she notices over the last couple of months that it seems to be \"moving around\" in her left anterior shoulder near the clavicle.  It is not painful.  There is no functional limitation.  She otherwise feels fine.    Objective   Vital Signs:  /73   Pulse 82   Temp 97.8 °F (36.6 °C) (Temporal)   Ht 170.2 cm (67\")   SpO2 100%   BMI 26.25 kg/m²   Estimated body mass index is 26.25 kg/m² as calculated from the following:    Height as of this encounter: 170.2 cm (67\").    Weight as of 11/13/23: 76 kg (167 lb 9.6 oz).               Physical Exam  Constitutional:       Appearance: Normal appearance.   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  The clavicle is definitely on cantilever and movable.  Nontender.  No palpable mass.  I can also palpate the distal fragment at the left AC joint.  Differential diagnosis includes left AC joint separation from 2 years ago.  In either case asymptomatic.  No palpable mass.        Result Review :                     Assessment and Plan     Diagnoses and all orders for this visit:    1. Closed displaced fracture of left clavicle, unspecified part of clavicle, initial encounter (Primary)  -     XR clavicle left; Future      Probable left clavicular fracture.  Old.  Asymptomatic.  Probable malunion.  At this point likely no treatment needed.  On getting an x-ray to confirm.  If anything suspicious " looking or pathological, would recommend orthopedic consultation.         Follow Up     No follow-ups on file.  Patient was given instructions and counseling regarding her condition or for health maintenance advice. Please see specific information pulled into the AVS if appropriate.

## 2024-03-26 ENCOUNTER — TELEPHONE (OUTPATIENT)
Dept: FAMILY MEDICINE CLINIC | Facility: CLINIC | Age: 89
End: 2024-03-26

## 2024-03-26 NOTE — TELEPHONE ENCOUNTER
"    Caller: Nenita Tavares \"PAT\"    Relationship to patient: Self    Best call back number: [phone]     Patient is needing: PATIENT CALLING FOR X-RAY RESULTS FROM YESTERDAY. PATIENT IS NOT GOING TO BE HOME FROM [address] THIS AFTERNOON BUT STATES IT IS OK TO LEAVE ON VOICEMAIL.         "

## 2024-03-26 NOTE — TELEPHONE ENCOUNTER
Valerianow pt she is aware that we don't have the report and will contact her with the results when its interpreted by

## 2024-07-16 ENCOUNTER — OFFICE VISIT (OUTPATIENT)
Dept: GASTROENTEROLOGY | Facility: CLINIC | Age: 89
End: 2024-07-16
Payer: MEDICARE

## 2024-07-16 VITALS
SYSTOLIC BLOOD PRESSURE: 120 MMHG | BODY MASS INDEX: 26.6 KG/M2 | WEIGHT: 169.5 LBS | HEART RATE: 86 BPM | DIASTOLIC BLOOD PRESSURE: 70 MMHG | OXYGEN SATURATION: 98 % | HEIGHT: 67 IN | TEMPERATURE: 96.8 F

## 2024-07-16 DIAGNOSIS — K21.9 GASTROESOPHAGEAL REFLUX DISEASE, UNSPECIFIED WHETHER ESOPHAGITIS PRESENT: Chronic | ICD-10-CM

## 2024-07-16 DIAGNOSIS — K58.0 IRRITABLE BOWEL SYNDROME WITH DIARRHEA: Primary | Chronic | ICD-10-CM

## 2024-07-16 PROCEDURE — 99214 OFFICE O/P EST MOD 30 MIN: CPT | Performed by: NURSE PRACTITIONER

## 2024-07-16 PROCEDURE — 1160F RVW MEDS BY RX/DR IN RCRD: CPT | Performed by: NURSE PRACTITIONER

## 2024-07-16 PROCEDURE — 1159F MED LIST DOCD IN RCRD: CPT | Performed by: NURSE PRACTITIONER

## 2024-07-16 RX ORDER — NORTRIPTYLINE HYDROCHLORIDE 10 MG/1
CAPSULE ORAL
Qty: 180 CAPSULE | Refills: 3 | Status: SHIPPED | OUTPATIENT
Start: 2024-07-16

## 2024-07-16 NOTE — PROGRESS NOTES
"Chief Complaint   Patient presents with    Follow-up     GERD, IBS-D         History of Present Illness  89-year-old female presents the office today for her annual office follow-up.  She was last seen in office on 7/12/2023.  She is a history of GERD and IBS-D.    She continues omeprazole 20 mg once daily for GERD with good control of symptoms.  She denies any nausea, vomiting, or dysphagia.      She has a history of chronic IBS-D.  She continues nortriptyline 20 mg at bedtime and takes a daily probiotic.  Chocolate can cause diarrhea.  She averages a bowel movement every other day.  Depending upon her oral intake, there are times when she can go several days between bowel movements, but states that stool is generally of normal consistency.  She denies any melena or hematochezia. She reports a 9 lb weight gain, but states this is from eating sweets.     Result Review :       Office Visit with Eneida Rivera APRN (07/12/2023)   Comprehensive Metabolic Panel (03/30/2021 14:10)   TSH Rfx On Abnormal To Free T4 (03/30/2021 14:10)   Vital Signs:   /70   Pulse 86   Temp 96.8 °F (36 °C)   Ht 170.2 cm (67.01\")   Wt 76.9 kg (169 lb 8 oz)   SpO2 98%   BMI 26.54 kg/m²     Body mass index is 26.54 kg/m².     Physical Exam  Vitals reviewed.   Constitutional:       Appearance: Normal appearance.   Pulmonary:      Effort: Pulmonary effort is normal. No respiratory distress.   Abdominal:      General: Abdomen is flat. Bowel sounds are normal. There is no distension.      Palpations: Abdomen is soft. There is no mass.      Tenderness: There is no abdominal tenderness. There is no guarding.   Musculoskeletal:         General: Normal range of motion.   Skin:     General: Skin is warm and dry.   Neurological:      General: No focal deficit present.      Mental Status: She is alert and oriented to person, place, and time.   Psychiatric:         Mood and Affect: Mood normal.         Behavior: Behavior normal.         " Thought Content: Thought content normal.         Judgment: Judgment normal.       Assessment and Plan    Diagnoses and all orders for this visit:    1. Irritable bowel syndrome with diarrhea (Primary)    2. Gastroesophageal reflux disease, unspecified whether esophagitis present    Other orders  -     nortriptyline (PAMELOR) 10 MG capsule; Take 2 capsules at bedtime  Dispense: 180 capsule; Refill: 3           Patient Instructions   1.  For IBS-D continue nortriptyline 20 mg at bedtime.    2.  Continue daily probiotic.    3.  For GERD, continue omeprazole 20 mg once daily.    4. For GERD, we recommend avoiding eating 3-4 hours before bedtime, eating smaller more frequent meals, and avoiding any known food triggers including spicy foods, tomatoes and tomato-based sauces, chocolate, coffee/tea, citrus fruits, carbonated  beverages and alcohol.     5.  Recommend annual office follow-up for reassessment of symptoms and medication refills or sooner should your symptoms worsen/recur.      Discussion:    Patient to continue nortriptyline nightly for IBS-D as well as a probiotic.  Also to continue omeprazole 20 mg once daily for GERD and implement antireflux precautions.  Recommend annual office follow-up for reassessment of symptoms and medication refills, or sooner should her symptoms worsen/recur.  Patient verbalized understanding of above plan of care and is in agreement.  All questions answered and support provided.    EMR Dragon/Transcription Disclaimer:  This document has been Dictated utilizing Dragon dictation.

## 2024-07-16 NOTE — PATIENT INSTRUCTIONS
1.  For IBS-D continue nortriptyline 20 mg at bedtime.    2.  Continue daily probiotic.    3.  For GERD, continue omeprazole 20 mg once daily.    4. For GERD, we recommend avoiding eating 3-4 hours before bedtime, eating smaller more frequent meals, and avoiding any known food triggers including spicy foods, tomatoes and tomato-based sauces, chocolate, coffee/tea, citrus fruits, carbonated  beverages and alcohol.     5.  Recommend annual office follow-up for reassessment of symptoms and medication refills or sooner should your symptoms worsen/recur.

## 2024-09-04 ENCOUNTER — OFFICE VISIT (OUTPATIENT)
Dept: FAMILY MEDICINE CLINIC | Facility: CLINIC | Age: 89
End: 2024-09-04
Payer: MEDICARE

## 2024-09-04 VITALS
HEART RATE: 66 BPM | DIASTOLIC BLOOD PRESSURE: 86 MMHG | TEMPERATURE: 97.3 F | WEIGHT: 165.5 LBS | SYSTOLIC BLOOD PRESSURE: 129 MMHG | OXYGEN SATURATION: 98 % | HEIGHT: 67 IN | BODY MASS INDEX: 25.98 KG/M2

## 2024-09-04 DIAGNOSIS — G47.00 INSOMNIA, UNSPECIFIED TYPE: ICD-10-CM

## 2024-09-04 DIAGNOSIS — R73.01 IMPAIRED FASTING GLUCOSE: ICD-10-CM

## 2024-09-04 DIAGNOSIS — Z00.00 MEDICARE ANNUAL WELLNESS VISIT, SUBSEQUENT: Primary | ICD-10-CM

## 2024-09-04 NOTE — PROGRESS NOTES
Subjective   The ABCs of the Annual Wellness Visit  Medicare Wellness Visit      Nenita Tavares is a 89 y.o. patient who presents for a Medicare Wellness Visit.    The following portions of the patient's history were reviewed and   updated as appropriate: allergies, current medications, past family history, past medical history, past social history, past surgical history, and problem list.    Compared to one year ago, the patient's physical   health is the same.  Compared to one year ago, the patient's mental   health is the same.    Recent Hospitalizations:  She was not admitted to the hospital during the last year.     Current Medical Providers:  Patient Care Team:  Srini Gustafson MD as PCP - General  Srini Gustafson MD as PCP - Family Medicine    Outpatient Medications Prior to Visit   Medication Sig Dispense Refill    Lactase (LACTAID PO) Take  by mouth as needed.      nortriptyline (PAMELOR) 10 MG capsule Take 2 capsules at bedtime 180 capsule 3    omeprazole (priLOSEC) 20 MG capsule Take  by mouth.      Probiotic Product (PROBIOTIC DAILY PO) Take  by mouth.       No facility-administered medications prior to visit.     No opioid medication identified on active medication list. I have reviewed chart for other potential  high risk medication/s and harmful drug interactions in the elderly.      Aspirin is not on active medication list.  Aspirin use is not indicated based on review of current medical condition/s. Risk of harm outweighs potential benefits.  .    Patient Active Problem List   Diagnosis    HLD (hyperlipidemia)    Irritable bowel syndrome with diarrhea    MCI (mild cognitive impairment)    COVID-19 virus infection    Insomnia     Advance Care Planning Advance Directive is on file.  ACP discussion was held with the patient during this visit. Patient has an advance directive in EMR which is still valid.             Objective   Vitals:    09/04/24 1105   BP: 129/86   Pulse: 66   Temp: 97.3 °F (36.3  "°C)   TempSrc: Temporal   SpO2: 98%   Weight: 75.1 kg (165 lb 8 oz)   Height: 170.2 cm (67.01\")       Estimated body mass index is 25.91 kg/m² as calculated from the following:    Height as of this encounter: 170.2 cm (67.01\").    Weight as of this encounter: 75.1 kg (165 lb 8 oz).    BMI is >= 25 and <30. (Overweight) The following options were offered after discussion;: exercise counseling/recommendations       Does the patient have evidence of cognitive impairment? No                                                                                                Health  Risk Assessment    Smoking Status:  Social History     Tobacco Use   Smoking Status Never   Smokeless Tobacco Never     Alcohol Consumption:  Social History     Substance and Sexual Activity   Alcohol Use Yes    Comment: social drink       Fall Risk Screen  STEADI Fall Risk Assessment was completed, and patient is at LOW risk for falls.Assessment completed on:2024    Depression Screenin/4/2024    11:05 AM   PHQ-2/PHQ-9 Depression Screening   Little Interest or Pleasure in Doing Things 0-->not at all   Feeling Down, Depressed or Hopeless 0-->not at all   PHQ-9: Brief Depression Severity Measure Score 0     Health Habits and Functional and Cognitive Screenin/30/2024     4:03 PM   Functional & Cognitive Status   Do you have difficulty preparing food and eating? No   Do you have difficulty bathing yourself, getting dressed or grooming yourself? No   Do you have difficulty using the toilet? No   Do you have difficulty moving around from place to place? No   Do you have trouble with steps or getting out of a bed or a chair? No   Current Diet Well Balanced Diet   Dental Exam Up to date   Eye Exam Up to date   Exercise (times per week) 0 times per week   Current Exercises Include No Regular Exercise   Do you need help using the phone?  No   Are you deaf or do you have serious difficulty hearing?  No   Do you need help to go to places " out of walking distance? No   Do you need help shopping? No   Do you need help preparing meals?  No   Do you need help with housework?  No   Do you need help with laundry? No   Do you need help taking your medications? No   Do you need help managing money? No   Do you ever drive or ride in a car without wearing a seat belt? No   Have you felt unusual stress, anger or loneliness in the last month? No   Who do you live with? Alone   If you need help, do you have trouble finding someone available to you? No   Have you been bothered in the last four weeks by sexual problems? No   Do you have difficulty concentrating, remembering or making decisions? No           Age-appropriate Screening Schedule:  Refer to the list below for future screening recommendations based on patient's age, sex and/or medical conditions. Orders for these recommended tests are listed in the plan section. The patient has been provided with a written plan.    Health Maintenance List  Health Maintenance   Topic Date Due    LIPID PANEL  Never done    TDAP/TD VACCINES (1 - Tdap) Never done    ZOSTER VACCINE (1 of 2) Never done    RSV Vaccine - Adults (1 - 1-dose 60+ series) Never done    DXA SCAN  07/16/2016    ANNUAL WELLNESS VISIT  08/24/2024    BMI FOLLOWUP  08/24/2024    COVID-19 Vaccine (4 - 2023-24 season) 09/01/2024    INFLUENZA VACCINE  08/01/2024    Pneumococcal Vaccine 65+  Completed    MAMMOGRAM  Discontinued                                                                                                                                                CMS Preventative Services Quick Reference  Risk Factors Identified During Encounter  Immunizations Discussed/Encouraged: Influenza, Prevnar 20 (Pneumococcal 20-valent conjugate), Shingrix, COVID19, and RSV (Respiratory Syncytial Virus)    The above risks/problems have been discussed with the patient.  Pertinent information has been shared with the patient in the After Visit Summary.  An After  "Visit Summary and PPPS were made available to the patient.    Follow Up:   Next Medicare Wellness visit to be scheduled in 1 year.         Additional E&M Note during same encounter follows:  Patient has additional, significant, and separately identifiable condition(s)/problem(s) that require work above and beyond the Medicare Wellness Visit     Chief Complaint  Medicare Wellness-subsequent    Subjective   HPI      Insomnia.  Longstanding.  Negative depression screen.  Reviewed her sleep hygiene.  She keeps the TV on in her bedroom.  She does not feel very tired most days when she cannot sleep.  She has interest in pleasurable activities.  She still driving without incident reportedly.  She still grieves the death of her son and .  She is keeping busy with her friends and [employer].  She has a history of impaired fasting glucose.  No lab work in about 3 years.  She has irritable bowel syndrome with diarrhea.  Continues nortriptyline 20 mg at nighttime prescribed by GI.  Occasional lightheadedness.  No dry mouth.  She states she drinks plenty of water.                Objective   Vital Signs:  /86   Pulse 66   Temp 97.3 °F (36.3 °C) (Temporal)   Ht 170.2 cm (67.01\")   Wt 75.1 kg (165 lb 8 oz)   SpO2 98%   BMI 25.91 kg/m²   Physical Exam  Constitutional:       Appearance: Normal appearance.   Cardiovascular:      Rate and Rhythm: Normal rate and regular rhythm.   Pulmonary:      Effort: Pulmonary effort is normal.      Breath sounds: Normal breath sounds.   Musculoskeletal:      Cervical back: No tenderness.   Lymphadenopathy:      Cervical: No cervical adenopathy.   Neurological:      Coordination: Coordination normal.      Gait: Gait normal.   Psychiatric:         Mood and Affect: Mood normal.                 Assessment and Plan     Annual Medicare wellness visit.    Immunizations recommended including Prevnar 20 today.  Patient declined all.    Osteoporosis screening.  Patient declined.    Irritable " bowel syndrome with diarrhea.  Takes nortriptyline 20 mg at nighttime prescribed by GI.  She appears to be tolerating the side effects.  The lightheadedness may be related to this.    Insomnia.  Likely multifactorial.  Including suboptimal sleep hygiene.  No definitive major depressive disorder or anxiety disorder.  I do not recommend sedatives.  Sleep hygiene discussed.    Impaired fasting glucose.  I am rechecking an A1c and other lab work.  See me in 1 year.  Sooner as needed          Medicare annual wellness visit, subsequent    Insomnia, unspecified type    Impaired fasting glucose      Orders Placed This Encounter   Procedures    Hemoglobin A1c     Order Specific Question:   Release to patient     Answer:   Routine Release [[id number]]    Comprehensive Metabolic Panel     Order Specific Question:   Release to patient     Answer:   Routine Release [[id number]]    CBC & Differential     Order Specific Question:   Manual Differential     Answer:   No     Order Specific Question:   Release to patient     Answer:   Routine Release [[id number]]             Follow Up   No follow-ups on file.  Patient was given instructions and counseling regarding her condition or for health maintenance advice. Please see specific information pulled into the AVS if appropriate.

## 2024-09-05 LAB
ALBUMIN SERPL-MCNC: 3.9 G/DL (ref 3.5–5.2)
ALBUMIN/GLOB SERPL: 1.9 G/DL
ALP SERPL-CCNC: 62 U/L (ref 39–117)
ALT SERPL-CCNC: 9 U/L (ref 1–33)
AST SERPL-CCNC: 15 U/L (ref 1–32)
BASOPHILS # BLD AUTO: 0.06 10*3/MM3 (ref 0–0.2)
BASOPHILS NFR BLD AUTO: 1.6 % (ref 0–1.5)
BILIRUB SERPL-MCNC: 0.4 MG/DL (ref 0–1.2)
BUN SERPL-MCNC: 26 MG/DL (ref 8–23)
BUN/CREAT SERPL: 22.2 (ref 7–25)
CALCIUM SERPL-MCNC: 9.5 MG/DL (ref 8.2–9.6)
CHLORIDE SERPL-SCNC: 103 MMOL/L (ref 98–107)
CO2 SERPL-SCNC: 26.6 MMOL/L (ref 22–29)
CREAT SERPL-MCNC: 1.17 MG/DL (ref 0.57–1)
EGFRCR SERPLBLD CKD-EPI 2021: 43.6 ML/MIN/1.73
EOSINOPHIL # BLD AUTO: 0.2 10*3/MM3 (ref 0–0.4)
EOSINOPHIL NFR BLD AUTO: 5.3 % (ref 0.3–6.2)
ERYTHROCYTE [DISTWIDTH] IN BLOOD BY AUTOMATED COUNT: 12.9 % (ref 12.3–15.4)
GLOBULIN SER CALC-MCNC: 2.1 GM/DL
GLUCOSE SERPL-MCNC: 86 MG/DL (ref 65–99)
HBA1C MFR BLD: 5.7 % (ref 4.8–5.6)
HCT VFR BLD AUTO: 36 % (ref 34–46.6)
HGB BLD-MCNC: 11.9 G/DL (ref 12–15.9)
IMM GRANULOCYTES # BLD AUTO: 0.01 10*3/MM3 (ref 0–0.05)
IMM GRANULOCYTES NFR BLD AUTO: 0.3 % (ref 0–0.5)
LYMPHOCYTES # BLD AUTO: 0.61 10*3/MM3 (ref 0.7–3.1)
LYMPHOCYTES NFR BLD AUTO: 16.3 % (ref 19.6–45.3)
MCH RBC QN AUTO: 31.6 PG (ref 26.6–33)
MCHC RBC AUTO-ENTMCNC: 33.1 G/DL (ref 31.5–35.7)
MCV RBC AUTO: 95.7 FL (ref 79–97)
MONOCYTES # BLD AUTO: 0.36 10*3/MM3 (ref 0.1–0.9)
MONOCYTES NFR BLD AUTO: 9.6 % (ref 5–12)
NEUTROPHILS # BLD AUTO: 2.5 10*3/MM3 (ref 1.7–7)
NEUTROPHILS NFR BLD AUTO: 66.9 % (ref 42.7–76)
NRBC BLD AUTO-RTO: 0 /100 WBC (ref 0–0.2)
PLATELET # BLD AUTO: 200 10*3/MM3 (ref 140–450)
POTASSIUM SERPL-SCNC: 4.2 MMOL/L (ref 3.5–5.2)
PROT SERPL-MCNC: 6 G/DL (ref 6–8.5)
RBC # BLD AUTO: 3.76 10*6/MM3 (ref 3.77–5.28)
SODIUM SERPL-SCNC: 138 MMOL/L (ref 136–145)
WBC # BLD AUTO: 3.74 10*3/MM3 (ref 3.4–10.8)

## 2025-06-30 ENCOUNTER — OFFICE VISIT (OUTPATIENT)
Dept: GASTROENTEROLOGY | Facility: CLINIC | Age: OVER 89
End: 2025-06-30
Payer: MEDICARE

## 2025-06-30 VITALS
BODY MASS INDEX: 25.99 KG/M2 | SYSTOLIC BLOOD PRESSURE: 108 MMHG | DIASTOLIC BLOOD PRESSURE: 64 MMHG | TEMPERATURE: 98 F | WEIGHT: 165.6 LBS | HEART RATE: 82 BPM | OXYGEN SATURATION: 98 % | HEIGHT: 67 IN

## 2025-06-30 DIAGNOSIS — E73.9 LACTOSE INTOLERANCE: Chronic | ICD-10-CM

## 2025-06-30 DIAGNOSIS — K58.0 IRRITABLE BOWEL SYNDROME WITH DIARRHEA: Primary | Chronic | ICD-10-CM

## 2025-06-30 DIAGNOSIS — K21.9 GASTROESOPHAGEAL REFLUX DISEASE, UNSPECIFIED WHETHER ESOPHAGITIS PRESENT: Chronic | ICD-10-CM

## 2025-06-30 PROCEDURE — 1159F MED LIST DOCD IN RCRD: CPT | Performed by: NURSE PRACTITIONER

## 2025-06-30 PROCEDURE — 99214 OFFICE O/P EST MOD 30 MIN: CPT | Performed by: NURSE PRACTITIONER

## 2025-06-30 PROCEDURE — 1160F RVW MEDS BY RX/DR IN RCRD: CPT | Performed by: NURSE PRACTITIONER

## 2025-06-30 RX ORDER — NORTRIPTYLINE HYDROCHLORIDE 10 MG/1
CAPSULE ORAL
Qty: 180 CAPSULE | Refills: 3 | Status: SHIPPED | OUTPATIENT
Start: 2025-06-30

## 2025-06-30 NOTE — PATIENT INSTRUCTIONS
IBS-D:  Continue nortriptyline 20 mg at bedtime.  Continue Lactaid     GERD:  Continue omeprazole 20 mg once daily.  We recommend avoiding eating 3-4 hours before bedtime, eating smaller more frequent meals, and avoiding any known food triggers including spicy foods, tomatoes and tomato-based sauces, chocolate, coffee/tea, citrus fruits, carbonated  beverages and alcohol.

## 2025-06-30 NOTE — PROGRESS NOTES
"Chief Complaint   Patient presents with    Follow-up     Hx of IBS-D and GERD           History of Present Illness  Patient presents for annual follow-up.    She has been on omeprazole 20 mg for many years.  Denies acid reflux or heartburn.  She can get indigestion if she eats trigger foods like chocolate.  Denies nausea, vomiting, dysphagia, or change in appetite although she does not eat much.  Weight is stable.  She has history of IBS-diarrhea managed with nortriptyline 20 mg daily.  Eating fruit can alter bowel habits.  No blood in stool.  She takes Lactaid due to history of lactose intolerance.  No changes to medical history in the past year.       Result Review :      Progress Notes by Eneida Rivera APRN (07/16/2024 10:45)     Comprehensive Metabolic Panel (09/04/2024 11:42) BUN 26, CR 1.17  CBC & Differential (09/04/2024 11:42) Hgb 11.9  Hemoglobin A1c (09/04/2024 11:42) 5.7    EGD and CLS in the past, cannot remember results     Medication list reviewed        Review of Systems   Constitutional: Negative.    HENT: Negative.     Eyes: Negative.    Respiratory: Negative.     Cardiovascular: Negative.    Gastrointestinal: Negative.    Endocrine: Negative.    Genitourinary: Negative.    Musculoskeletal: Negative.    Skin: Negative.    Allergic/Immunologic: Negative.    Neurological: Negative.    Hematological: Negative.    Psychiatric/Behavioral: Negative.           Vital Signs:   /64   Pulse 82   Temp 98 °F (36.7 °C)   Ht 170.2 cm (67\")   Wt 75.1 kg (165 lb 9.6 oz)   SpO2 98%   BMI 25.94 kg/m²     Body mass index is 25.94 kg/m².     Physical Exam  Constitutional:       Appearance: Normal appearance.   HENT:      Head: Normocephalic and atraumatic.      Nose: Nose normal.   Eyes:      Extraocular Movements: Extraocular movements intact.      Conjunctiva/sclera: Conjunctivae normal.      Pupils: Pupils are equal, round, and reactive to light.   Cardiovascular:      Rate and Rhythm: Normal rate. "   Pulmonary:      Effort: Pulmonary effort is normal.   Musculoskeletal:      Cervical back: Normal range of motion.   Neurological:      General: No focal deficit present.      Mental Status: She is alert and oriented to person, place, and time.   Psychiatric:         Mood and Affect: Mood normal.         Behavior: Behavior normal.         Thought Content: Thought content normal.         Judgment: Judgment normal.             Assessment and Plan    Diagnoses and all orders for this visit:    1. Irritable bowel syndrome with diarrhea (Primary)  -     nortriptyline (PAMELOR) 10 MG capsule; Take 2 capsules at bedtime  Dispense: 180 capsule; Refill: 3    2. Gastroesophageal reflux disease, unspecified whether esophagitis present    3. Lactose intolerance       Discussion:  Patient is doing well on current regimen.  Continue nortriptyline 20 mg daily for management of IBS-diarrhea, continue omeprazole 20 mg daily for management of GERD, and continue Lactaid for lactose intolerance.    Return in about 1 year (around 6/30/2026).       Patient Instructions   IBS-D:  Continue nortriptyline 20 mg at bedtime.  Continue Lactaid     GERD:  Continue omeprazole 20 mg once daily.  We recommend avoiding eating 3-4 hours before bedtime, eating smaller more frequent meals, and avoiding any known food triggers including spicy foods, tomatoes and tomato-based sauces, chocolate, coffee/tea, citrus fruits, carbonated  beverages and alcohol.          Patient or patient representative verbalized consent for the use of Ambient Listening during the visit with  RENETTA Fox for chart documentation. 6/30/2025  11:10 EDT            RENETTA Latham  [facility] [address]  Office: [phone]